# Patient Record
Sex: FEMALE | Race: WHITE | NOT HISPANIC OR LATINO | Employment: OTHER | URBAN - METROPOLITAN AREA
[De-identification: names, ages, dates, MRNs, and addresses within clinical notes are randomized per-mention and may not be internally consistent; named-entity substitution may affect disease eponyms.]

---

## 2017-07-18 ENCOUNTER — PREP FOR SURGERY (OUTPATIENT)
Dept: OTHER | Facility: HOSPITAL | Age: 67
End: 2017-07-18

## 2017-07-18 DIAGNOSIS — R19.5 POSITIVE COLORECTAL CANCER SCREENING USING DNA-BASED STOOL TEST: Primary | ICD-10-CM

## 2017-08-10 PROBLEM — R19.5 POSITIVE COLORECTAL CANCER SCREENING USING DNA-BASED STOOL TEST: Status: ACTIVE | Noted: 2017-08-10

## 2017-08-30 RX ORDER — ATORVASTATIN CALCIUM 20 MG/1
20 TABLET, FILM COATED ORAL DAILY
COMMUNITY
End: 2020-10-05

## 2017-08-30 RX ORDER — CETIRIZINE HYDROCHLORIDE 10 MG/1
10 TABLET ORAL DAILY
COMMUNITY

## 2017-08-30 RX ORDER — LANOLIN ALCOHOL/MO/W.PET/CERES
1000 CREAM (GRAM) TOPICAL EVERY OTHER DAY
COMMUNITY

## 2017-08-30 RX ORDER — IRBESARTAN 150 MG/1
150 TABLET ORAL NIGHTLY
COMMUNITY
End: 2018-09-27

## 2017-08-31 ENCOUNTER — ANESTHESIA (OUTPATIENT)
Dept: GASTROENTEROLOGY | Facility: HOSPITAL | Age: 67
End: 2017-08-31

## 2017-08-31 ENCOUNTER — ANESTHESIA EVENT (OUTPATIENT)
Dept: GASTROENTEROLOGY | Facility: HOSPITAL | Age: 67
End: 2017-08-31

## 2017-08-31 ENCOUNTER — HOSPITAL ENCOUNTER (OUTPATIENT)
Facility: HOSPITAL | Age: 67
Setting detail: HOSPITAL OUTPATIENT SURGERY
Discharge: HOME | End: 2017-08-31
Attending: SURGERY | Admitting: SURGERY

## 2017-08-31 VITALS
SYSTOLIC BLOOD PRESSURE: 126 MMHG | OXYGEN SATURATION: 94 % | HEART RATE: 66 BPM | BODY MASS INDEX: 26.68 KG/M2 | TEMPERATURE: 97.7 F | RESPIRATION RATE: 16 BRPM | WEIGHT: 166 LBS | HEIGHT: 66 IN | DIASTOLIC BLOOD PRESSURE: 72 MMHG

## 2017-08-31 DIAGNOSIS — R19.5 POSITIVE COLORECTAL CANCER SCREENING USING DNA-BASED STOOL TEST: ICD-10-CM

## 2017-08-31 LAB — GLUCOSE BLDC GLUCOMTR-MCNC: 149 MG/DL (ref 70–130)

## 2017-08-31 PROCEDURE — 45380 COLONOSCOPY AND BIOPSY: CPT | Performed by: SURGERY

## 2017-08-31 PROCEDURE — 25010000002 PROPOFOL 10 MG/ML EMULSION: Performed by: ANESTHESIOLOGY

## 2017-08-31 PROCEDURE — 82962 GLUCOSE BLOOD TEST: CPT

## 2017-08-31 PROCEDURE — S0260 H&P FOR SURGERY: HCPCS | Performed by: SURGERY

## 2017-08-31 PROCEDURE — 88305 TISSUE EXAM BY PATHOLOGIST: CPT | Performed by: SURGERY

## 2017-08-31 RX ORDER — LIDOCAINE HYDROCHLORIDE 20 MG/ML
INJECTION, SOLUTION INFILTRATION; PERINEURAL AS NEEDED
Status: DISCONTINUED | OUTPATIENT
Start: 2017-08-31 | End: 2017-08-31 | Stop reason: SURG

## 2017-08-31 RX ORDER — SODIUM CHLORIDE, SODIUM LACTATE, POTASSIUM CHLORIDE, CALCIUM CHLORIDE 600; 310; 30; 20 MG/100ML; MG/100ML; MG/100ML; MG/100ML
30 INJECTION, SOLUTION INTRAVENOUS CONTINUOUS PRN
Status: DISCONTINUED | OUTPATIENT
Start: 2017-08-31 | End: 2017-08-31 | Stop reason: HOSPADM

## 2017-08-31 RX ORDER — PROPOFOL 10 MG/ML
VIAL (ML) INTRAVENOUS AS NEEDED
Status: DISCONTINUED | OUTPATIENT
Start: 2017-08-31 | End: 2017-08-31 | Stop reason: SURG

## 2017-08-31 RX ORDER — PROPOFOL 10 MG/ML
VIAL (ML) INTRAVENOUS CONTINUOUS PRN
Status: DISCONTINUED | OUTPATIENT
Start: 2017-08-31 | End: 2017-08-31 | Stop reason: SURG

## 2017-08-31 RX ADMIN — PROPOFOL 140 MCG/KG/MIN: 10 INJECTION, EMULSION INTRAVENOUS at 08:05

## 2017-08-31 RX ADMIN — SODIUM CHLORIDE, POTASSIUM CHLORIDE, SODIUM LACTATE AND CALCIUM CHLORIDE 30 ML/HR: 600; 310; 30; 20 INJECTION, SOLUTION INTRAVENOUS at 07:23

## 2017-08-31 RX ADMIN — LIDOCAINE HYDROCHLORIDE 50 MG: 20 INJECTION, SOLUTION INFILTRATION; PERINEURAL at 08:05

## 2017-08-31 RX ADMIN — PROPOFOL 150 MG: 10 INJECTION, EMULSION INTRAVENOUS at 08:05

## 2017-09-01 LAB
CYTO UR: NORMAL
LAB AP CASE REPORT: NORMAL
Lab: NORMAL
PATH REPORT.FINAL DX SPEC: NORMAL
PATH REPORT.GROSS SPEC: NORMAL

## 2018-09-27 RX ORDER — VIT C/B6/B5/MAGNESIUM/HERB 173 50-5-6-5MG
1 CAPSULE ORAL DAILY
COMMUNITY
End: 2019-10-31

## 2018-09-27 RX ORDER — IRBESARTAN 300 MG/1
300 TABLET ORAL NIGHTLY
COMMUNITY
End: 2019-10-31

## 2018-09-27 RX ORDER — KETOCONAZOLE 20 MG/ML
1 SHAMPOO TOPICAL 2 TIMES WEEKLY
COMMUNITY

## 2018-09-27 RX ORDER — KETOCONAZOLE 20 MG/G
1 CREAM TOPICAL AS NEEDED
COMMUNITY
End: 2020-06-15 | Stop reason: SDUPTHER

## 2018-09-27 RX ORDER — FLUOCINONIDE TOPICAL SOLUTION USP, 0.05% 0.5 MG/ML
1 SOLUTION TOPICAL 2 TIMES DAILY
COMMUNITY
End: 2019-10-31

## 2018-09-28 ENCOUNTER — HOSPITAL ENCOUNTER (OUTPATIENT)
Facility: HOSPITAL | Age: 68
Setting detail: HOSPITAL OUTPATIENT SURGERY
Discharge: HOME | End: 2018-09-28
Attending: UROLOGY | Admitting: UROLOGY

## 2018-09-28 ENCOUNTER — ANESTHESIA EVENT (OUTPATIENT)
Dept: PERIOP | Facility: HOSPITAL | Age: 68
End: 2018-09-28

## 2018-09-28 ENCOUNTER — ANESTHESIA (OUTPATIENT)
Dept: PERIOP | Facility: HOSPITAL | Age: 68
End: 2018-09-28

## 2018-09-28 VITALS
TEMPERATURE: 98.6 F | HEIGHT: 66 IN | DIASTOLIC BLOOD PRESSURE: 60 MMHG | OXYGEN SATURATION: 93 % | SYSTOLIC BLOOD PRESSURE: 114 MMHG | HEART RATE: 78 BPM | RESPIRATION RATE: 16 BRPM

## 2018-09-28 DIAGNOSIS — N20.0 RENAL CALCULUS, LEFT: Primary | ICD-10-CM

## 2018-09-28 LAB — GLUCOSE BLDC GLUCOMTR-MCNC: 143 MG/DL (ref 70–130)

## 2018-09-28 PROCEDURE — 25010000002 KETOROLAC TROMETHAMINE PER 15 MG: Performed by: NURSE ANESTHETIST, CERTIFIED REGISTERED

## 2018-09-28 PROCEDURE — C1758 CATHETER, URETERAL: HCPCS | Performed by: UROLOGY

## 2018-09-28 PROCEDURE — 25010000003 CEFAZOLIN 1-4 GM/50ML-% SOLUTION: Performed by: UROLOGY

## 2018-09-28 PROCEDURE — 25010000002 ONDANSETRON PER 1 MG: Performed by: NURSE ANESTHETIST, CERTIFIED REGISTERED

## 2018-09-28 PROCEDURE — 25010000002 FENTANYL CITRATE (PF) 100 MCG/2ML SOLUTION: Performed by: ANESTHESIOLOGY

## 2018-09-28 PROCEDURE — 25010000002 MIDAZOLAM PER 1 MG: Performed by: ANESTHESIOLOGY

## 2018-09-28 PROCEDURE — 25010000002 PROPOFOL 10 MG/ML EMULSION: Performed by: NURSE ANESTHETIST, CERTIFIED REGISTERED

## 2018-09-28 PROCEDURE — 82962 GLUCOSE BLOOD TEST: CPT

## 2018-09-28 PROCEDURE — 93005 ELECTROCARDIOGRAM TRACING: CPT | Performed by: ANESTHESIOLOGY

## 2018-09-28 PROCEDURE — C1769 GUIDE WIRE: HCPCS | Performed by: UROLOGY

## 2018-09-28 RX ORDER — HYDROCODONE BITARTRATE AND ACETAMINOPHEN 5; 325 MG/1; MG/1
1 TABLET ORAL EVERY 6 HOURS PRN
Status: DISCONTINUED | OUTPATIENT
Start: 2018-09-28 | End: 2018-09-28 | Stop reason: HOSPADM

## 2018-09-28 RX ORDER — SULFAMETHOXAZOLE AND TRIMETHOPRIM 800; 160 MG/1; MG/1
1 TABLET ORAL 2 TIMES DAILY
Qty: 6 TABLET | Refills: 0 | Status: SHIPPED | OUTPATIENT
Start: 2018-09-28 | End: 2019-10-31

## 2018-09-28 RX ORDER — TAMSULOSIN HYDROCHLORIDE 0.4 MG/1
1 CAPSULE ORAL DAILY
Qty: 10 CAPSULE | Refills: 0 | Status: SHIPPED | OUTPATIENT
Start: 2018-09-28 | End: 2018-09-28

## 2018-09-28 RX ORDER — FENTANYL CITRATE 50 UG/ML
50 INJECTION, SOLUTION INTRAMUSCULAR; INTRAVENOUS
Status: DISCONTINUED | OUTPATIENT
Start: 2018-09-28 | End: 2018-09-28 | Stop reason: HOSPADM

## 2018-09-28 RX ORDER — PROPOFOL 10 MG/ML
VIAL (ML) INTRAVENOUS AS NEEDED
Status: DISCONTINUED | OUTPATIENT
Start: 2018-09-28 | End: 2018-09-28 | Stop reason: SURG

## 2018-09-28 RX ORDER — MIDAZOLAM HYDROCHLORIDE 1 MG/ML
2 INJECTION INTRAMUSCULAR; INTRAVENOUS
Status: DISCONTINUED | OUTPATIENT
Start: 2018-09-28 | End: 2018-09-28 | Stop reason: HOSPADM

## 2018-09-28 RX ORDER — MIDAZOLAM HYDROCHLORIDE 1 MG/ML
1 INJECTION INTRAMUSCULAR; INTRAVENOUS
Status: DISCONTINUED | OUTPATIENT
Start: 2018-09-28 | End: 2018-09-28 | Stop reason: HOSPADM

## 2018-09-28 RX ORDER — FAMOTIDINE 10 MG/ML
20 INJECTION, SOLUTION INTRAVENOUS ONCE
Status: COMPLETED | OUTPATIENT
Start: 2018-09-28 | End: 2018-09-28

## 2018-09-28 RX ORDER — LIDOCAINE HYDROCHLORIDE 10 MG/ML
0.5 INJECTION, SOLUTION EPIDURAL; INFILTRATION; INTRACAUDAL; PERINEURAL ONCE AS NEEDED
Status: DISCONTINUED | OUTPATIENT
Start: 2018-09-28 | End: 2018-09-28 | Stop reason: HOSPADM

## 2018-09-28 RX ORDER — SODIUM CHLORIDE, SODIUM LACTATE, POTASSIUM CHLORIDE, CALCIUM CHLORIDE 600; 310; 30; 20 MG/100ML; MG/100ML; MG/100ML; MG/100ML
9 INJECTION, SOLUTION INTRAVENOUS CONTINUOUS
Status: DISCONTINUED | OUTPATIENT
Start: 2018-09-28 | End: 2018-09-28 | Stop reason: HOSPADM

## 2018-09-28 RX ORDER — SULFAMETHOXAZOLE AND TRIMETHOPRIM 800; 160 MG/1; MG/1
1 TABLET ORAL 2 TIMES DAILY
Qty: 10 TABLET | Refills: 0 | Status: SHIPPED | OUTPATIENT
Start: 2018-09-28 | End: 2019-10-31

## 2018-09-28 RX ORDER — ONDANSETRON 4 MG/1
4 TABLET, FILM COATED ORAL DAILY PRN
Qty: 12 TABLET | Refills: 0 | Status: SHIPPED | OUTPATIENT
Start: 2018-09-28 | End: 2019-10-31

## 2018-09-28 RX ORDER — HYDROCODONE BITARTRATE AND ACETAMINOPHEN 7.5; 325 MG/1; MG/1
1-2 TABLET ORAL EVERY 4 HOURS PRN
Qty: 20 TABLET | Refills: 0 | Status: SHIPPED | OUTPATIENT
Start: 2018-09-28 | End: 2019-10-31

## 2018-09-28 RX ORDER — CEFAZOLIN SODIUM 1 G/50ML
1 INJECTION, SOLUTION INTRAVENOUS ONCE
Status: COMPLETED | OUTPATIENT
Start: 2018-09-28 | End: 2018-09-28

## 2018-09-28 RX ORDER — EPHEDRINE SULFATE 50 MG/ML
INJECTION, SOLUTION INTRAVENOUS AS NEEDED
Status: DISCONTINUED | OUTPATIENT
Start: 2018-09-28 | End: 2018-09-28 | Stop reason: SURG

## 2018-09-28 RX ORDER — CIPROFLOXACIN 500 MG/1
500 TABLET, FILM COATED ORAL EVERY 12 HOURS SCHEDULED
COMMUNITY
End: 2019-10-31

## 2018-09-28 RX ORDER — KETOROLAC TROMETHAMINE 30 MG/ML
INJECTION, SOLUTION INTRAMUSCULAR; INTRAVENOUS AS NEEDED
Status: DISCONTINUED | OUTPATIENT
Start: 2018-09-28 | End: 2018-09-28 | Stop reason: SURG

## 2018-09-28 RX ORDER — ONDANSETRON 2 MG/ML
INJECTION INTRAMUSCULAR; INTRAVENOUS AS NEEDED
Status: DISCONTINUED | OUTPATIENT
Start: 2018-09-28 | End: 2018-09-28 | Stop reason: SURG

## 2018-09-28 RX ORDER — LIDOCAINE HYDROCHLORIDE 20 MG/ML
INJECTION, SOLUTION INFILTRATION; PERINEURAL AS NEEDED
Status: DISCONTINUED | OUTPATIENT
Start: 2018-09-28 | End: 2018-09-28 | Stop reason: SURG

## 2018-09-28 RX ORDER — SODIUM CHLORIDE 9 MG/ML
INJECTION, SOLUTION INTRAVENOUS AS NEEDED
Status: DISCONTINUED | OUTPATIENT
Start: 2018-09-28 | End: 2018-09-28 | Stop reason: HOSPADM

## 2018-09-28 RX ORDER — TRAMADOL HYDROCHLORIDE 50 MG/1
50 TABLET ORAL EVERY 6 HOURS PRN
COMMUNITY
End: 2019-10-31

## 2018-09-28 RX ORDER — HYDROCODONE BITARTRATE AND ACETAMINOPHEN 7.5; 325 MG/1; MG/1
1-2 TABLET ORAL EVERY 4 HOURS PRN
Qty: 20 TABLET | Refills: 0 | Status: SHIPPED | OUTPATIENT
Start: 2018-09-28 | End: 2018-09-28

## 2018-09-28 RX ORDER — SODIUM CHLORIDE 0.9 % (FLUSH) 0.9 %
1-10 SYRINGE (ML) INJECTION AS NEEDED
Status: DISCONTINUED | OUTPATIENT
Start: 2018-09-28 | End: 2018-09-28 | Stop reason: HOSPADM

## 2018-09-28 RX ADMIN — FENTANYL CITRATE 50 MCG: 50 INJECTION INTRAMUSCULAR; INTRAVENOUS at 09:22

## 2018-09-28 RX ADMIN — MIDAZOLAM HYDROCHLORIDE 1 MG: 2 INJECTION, SOLUTION INTRAMUSCULAR; INTRAVENOUS at 08:55

## 2018-09-28 RX ADMIN — CEFAZOLIN SODIUM 1 G: 1 INJECTION, SOLUTION INTRAVENOUS at 09:26

## 2018-09-28 RX ADMIN — ONDANSETRON 4 MG: 2 INJECTION INTRAMUSCULAR; INTRAVENOUS at 09:28

## 2018-09-28 RX ADMIN — FAMOTIDINE 20 MG: 10 INJECTION, SOLUTION INTRAVENOUS at 08:52

## 2018-09-28 RX ADMIN — EPHEDRINE SULFATE 10 MG: 50 INJECTION INTRAMUSCULAR; INTRAVENOUS; SUBCUTANEOUS at 09:32

## 2018-09-28 RX ADMIN — SODIUM CHLORIDE, POTASSIUM CHLORIDE, SODIUM LACTATE AND CALCIUM CHLORIDE 9 ML/HR: 600; 310; 30; 20 INJECTION, SOLUTION INTRAVENOUS at 08:52

## 2018-09-28 RX ADMIN — KETOROLAC TROMETHAMINE 30 MG: 30 INJECTION, SOLUTION INTRAMUSCULAR; INTRAVENOUS at 09:28

## 2018-09-28 RX ADMIN — LIDOCAINE HYDROCHLORIDE 40 MG: 20 INJECTION, SOLUTION INFILTRATION; PERINEURAL at 09:25

## 2018-09-28 RX ADMIN — PROPOFOL 200 MG: 10 INJECTION, EMULSION INTRAVENOUS at 09:25

## 2018-09-28 RX ADMIN — EPHEDRINE SULFATE 10 MG: 50 INJECTION INTRAMUSCULAR; INTRAVENOUS; SUBCUTANEOUS at 09:52

## 2018-09-28 RX ADMIN — MIDAZOLAM HYDROCHLORIDE 1 MG: 2 INJECTION, SOLUTION INTRAMUSCULAR; INTRAVENOUS at 08:53

## 2018-09-28 RX ADMIN — EPHEDRINE SULFATE 10 MG: 50 INJECTION INTRAMUSCULAR; INTRAVENOUS; SUBCUTANEOUS at 09:42

## 2018-09-28 RX ADMIN — HYDROCODONE BITARTRATE AND ACETAMINOPHEN 1 TABLET: 5; 325 TABLET ORAL at 10:40

## 2018-09-28 NOTE — ANESTHESIA PREPROCEDURE EVALUATION
Anesthesia Evaluation     Patient summary reviewed and Nursing notes reviewed   NPO Solid Status: > 8 hours  NPO Liquid Status: > 2 hours           Airway   Mallampati: II  no difficulty expected  Comment: Small mouth  Dental - normal exam     Pulmonary     breath sounds clear to auscultation  Cardiovascular     ECG reviewed  Rhythm: regular  Rate: normal    (+) hypertension,       Neuro/Psych  GI/Hepatic/Renal/Endo    (+)   diabetes mellitus type 2,     Musculoskeletal     Abdominal    Substance History      OB/GYN          Other                        Anesthesia Plan    ASA 3     general     intravenous induction   Anesthetic plan, all risks, benefits, and alternatives have been provided, discussed and informed consent has been obtained with: patient.

## 2018-09-28 NOTE — ANESTHESIA PROCEDURE NOTES
Airway  Urgency: elective    Airway not difficult    General Information and Staff    Patient location during procedure: OR  Anesthesiologist: JENNIFER GORDON    Indications and Patient Condition  Indications for airway management: airway protection    Preoxygenated: yes  Mask difficulty assessment: 1 - vent by mask    Final Airway Details  Final airway type: supraglottic airway      Successful airway: unique  Size 4    Number of attempts at approach: 1    Additional Comments  Smooth IV/mask induction and placement of LMA. Atraumatic, lips/teeth/mouth intact, as preop. +ETCO2, bilateral breath sounds and equal. By redd

## 2018-09-28 NOTE — ANESTHESIA POSTPROCEDURE EVALUATION
Patient: Valery Maynard    Procedure Summary     Date:  09/28/18 Room / Location:   [facility]  [facility]  [facility]    Anesthesia Start:  0921 Anesthesia Stop:  1004    Procedure:  LT EXTRACORPOREAL SHOCKWAVE LITHOTRIPSY AND CYSTOSCOPY (Left ) Diagnosis:      Surgeon:  Ye Moreau MD Provider:  Los De La Garza MD    Anesthesia Type:  general ASA Status:  3          Anesthesia Type: general  Last vitals  BP   114/60 (09/28/18 1105)   Temp   37 °C (98.6 °F) (09/28/18 1045)   Pulse   78 (09/28/18 1105)   Resp   16 (09/28/18 1105)     SpO2   93 % (09/28/18 1105)     Post Anesthesia Care and Evaluation    Patient location during evaluation: bedside  Patient participation: complete - patient participated  Level of consciousness: awake  Pain score: 2  Pain management: adequate  Airway patency: patent  Anesthetic complications: No anesthetic complications    Cardiovascular status: acceptable  Respiratory status: acceptable  Hydration status: acceptable    Comments: --------------------            09/28/18               1105     --------------------   BP:       114/60     Pulse:      78       Resp:       16       Temp:                SpO2:      93%      --------------------

## 2019-10-31 ENCOUNTER — OFFICE VISIT (OUTPATIENT)
Dept: FAMILY MEDICINE CLINIC | Facility: CLINIC | Age: 69
End: 2019-10-31

## 2019-10-31 VITALS
SYSTOLIC BLOOD PRESSURE: 112 MMHG | DIASTOLIC BLOOD PRESSURE: 84 MMHG | WEIGHT: 163.2 LBS | HEIGHT: 66 IN | BODY MASS INDEX: 26.23 KG/M2 | TEMPERATURE: 98.3 F | HEART RATE: 65 BPM | OXYGEN SATURATION: 97 %

## 2019-10-31 DIAGNOSIS — N30.00 ACUTE CYSTITIS WITHOUT HEMATURIA: ICD-10-CM

## 2019-10-31 DIAGNOSIS — I10 ESSENTIAL HYPERTENSION: ICD-10-CM

## 2019-10-31 DIAGNOSIS — R35.0 URINARY FREQUENCY: ICD-10-CM

## 2019-10-31 DIAGNOSIS — E11.9 TYPE 2 DIABETES MELLITUS WITHOUT COMPLICATION, WITHOUT LONG-TERM CURRENT USE OF INSULIN (HCC): Primary | ICD-10-CM

## 2019-10-31 DIAGNOSIS — E78.5 HYPERLIPIDEMIA, UNSPECIFIED HYPERLIPIDEMIA TYPE: ICD-10-CM

## 2019-10-31 LAB
BILIRUB BLD-MCNC: NEGATIVE MG/DL
GLUCOSE UR STRIP-MCNC: NEGATIVE MG/DL
HBA1C MFR BLD: 6.8 %
KETONES UR QL: NEGATIVE
LEUKOCYTE EST, POC: NEGATIVE
NITRITE UR-MCNC: POSITIVE MG/ML
PH UR: 5.5 [PH] (ref 5–8)
POC MICROALBUMIN URINE: 30
PROT UR STRIP-MCNC: NEGATIVE MG/DL
RBC # UR STRIP: NEGATIVE /UL
SP GR UR: 1.02 (ref 1–1.03)
UROBILINOGEN UR QL: NORMAL

## 2019-10-31 PROCEDURE — 99214 OFFICE O/P EST MOD 30 MIN: CPT | Performed by: FAMILY MEDICINE

## 2019-10-31 PROCEDURE — 83036 HEMOGLOBIN GLYCOSYLATED A1C: CPT | Performed by: FAMILY MEDICINE

## 2019-10-31 PROCEDURE — 81003 URINALYSIS AUTO W/O SCOPE: CPT | Performed by: FAMILY MEDICINE

## 2019-10-31 PROCEDURE — 82044 UR ALBUMIN SEMIQUANTITATIVE: CPT | Performed by: FAMILY MEDICINE

## 2019-10-31 RX ORDER — NITROFURANTOIN MACROCRYSTALS 100 MG/1
100 CAPSULE ORAL 2 TIMES DAILY
Qty: 10 CAPSULE | Refills: 0 | Status: SHIPPED | OUTPATIENT
Start: 2019-10-31 | End: 2020-02-03

## 2019-10-31 RX ORDER — IRBESARTAN 300 MG/1
300 TABLET ORAL NIGHTLY
COMMUNITY
End: 2020-10-05

## 2019-10-31 RX ORDER — METFORMIN HYDROCHLORIDE 500 MG/1
TABLET, EXTENDED RELEASE ORAL
Refills: 3 | COMMUNITY
Start: 2019-09-06 | End: 2020-06-01

## 2019-10-31 NOTE — PROGRESS NOTES
Subjective   Valery Maynard is a 69 y.o. female.     Chief Complaint   Patient presents with   • Diabetes        Patient doing well and is here to establish my new office.  She is compliant with her medication and watching her diet for diabetes.  The only concern she has is that she feels she has been drinking too much water she is she is getting up 2-3 times at night to empty her bladder over the past few weeks when she used to only get up once.  She denies any pain with urination.  She denies any vaginal discharge.  She denies any urinary odor.         Home Blood Glucose Monitoring: doing well  Last Eye Exam: 10/30/19  Last microalbumin: now  Last Foot Exam: now    The following portions of the patient's history were reviewed and updated as appropriate: allergies, current medications, past family history, past medical history, past social history, past surgical history and problem list.    Past Medical History:   Diagnosis Date   • Allergic conjunctivitis    • Arthritis     R FOOT   • Back pain, acute    • Contusion of right knee    • Cough    • Diabetes mellitus (CMS/[facility])    • Diabetes mellitus type 2, controlled (CMS/[facility])    • Encounter for preventive health examination    • Fatigue    • Former smoker     quit smoking 30 years ago, was 1 ppd for 10-15 years   • Hematuria    • History of kidney stones    • History of nephrolithiasis    • History of urinary tract infection     History of Acute UTI   • Hyperlipidemia    • Hypertension    • Kidney stone    • Menopause    • Microalbuminuria    • Murmur    • Muscle cramp    • Need for immunization against influenza    • Need for influenza vaccination    • Need for pneumococcal vaccine    • Peripheral neuropathy    • Psoriasis    • Rash     [facility] WENT TO DERMATOLOGIST, GAVE PATIENT PRESCRIPTION CREAM   • Screening for malignant neoplasm of colon    • Visit for screening mammogram    • Vitamin B12 deficiency    • Vitamin D deficiency        Past Surgical History:    Procedure Laterality Date   • COLONOSCOPY N/A 8/31/2017    Procedure: COLONOSCOPY TO CECUM WITH COLD BIOPSY POLYPECTOMIES;  Surgeon: Dimitri Olivera MD;  Location: [facility] ENDOSCOPY;  Service:    • EXTRACORPOREAL SHOCK WAVE LITHOTRIPSY (ESWL) Left 9/28/2018    Procedure: LT EXTRACORPOREAL SHOCKWAVE LITHOTRIPSY AND CYSTOSCOPY;  Surgeon: Ye Moreau MD;  Location[place] [facility] [place] [facility] [place];  Service: Urology   • KIDNEY STONE SURGERY         Family History   Problem Relation Age of Onset   • Coronary artery disease Mother    • Arthritis Father    • Diabetes Father    • No Known Problems Other    • Malig Hyperthermia Neg Hx        Social History     Socioeconomic History   • Marital status:      Spouse name: Not on file   • Number of children: Not on file   • Years of education: Not on file   • Highest education level: Not on file   Tobacco Use   • Smoking status: Former Smoker   • Smokeless tobacco: Never Used   • Tobacco comment: QUIT 25 YRS AGO-was 1 ppd for 10-15 years   Substance and Sexual Activity   • Alcohol use: Yes     Comment: RARE   • Drug use: No   • Sexual activity: Defer         Current Outpatient Medications:   •  atorvastatin (LIPITOR) 20 MG tablet, Take 20 mg by mouth Daily., Disp: , Rfl:   •  cetirizine (zyrTEC) 10 MG tablet, Take 10 mg by mouth Daily., Disp: , Rfl:   •  irbesartan (AVAPRO) 300 MG tablet, Take 300 mg by mouth Every Night., Disp: , Rfl:   •  ketoconazole (NIZORAL) 2 % cream, Apply 1 application topically to the appropriate area as directed As Needed for Itching., Disp: , Rfl:   •  ketoconazole (NIZORAL) 2 % shampoo, Apply 1 application topically to the appropriate area as directed 2 (Two) Times a Week., Disp: , Rfl:   •  metFORMIN ER (GLUCOPHAGE-XR) 500 MG 24 hr tablet, TAKE THREE TABLETS BY MOUTH EVERY EVENING FOR BLOOD SUGAR, Disp: , Rfl: 3  •  SITagliptin (JANUVIA) 100 MG tablet, Take 100 mg by mouth Every Morning., Disp: , Rfl:   •  vitamin B-12 (CYANOCOBALAMIN) 1000 MCG  "tablet, Take 1,000 mcg by mouth Every Other Day., Disp: , Rfl:   •  nitrofurantoin (MACRODANTIN) 100 MG capsule, Take 1 capsule by mouth 2 (Two) Times a Day., Disp: 10 capsule, Rfl: 0    Review of Systems   Constitutional: Negative for chills, fatigue and fever.   HENT: Negative for congestion, rhinorrhea and sore throat.    Respiratory: Negative for cough and shortness of breath.    Cardiovascular: Negative for chest pain and leg swelling.   Gastrointestinal: Negative for abdominal pain.   Endocrine: Negative for polydipsia and polyuria.   Genitourinary: Positive for frequency. Negative for dysuria.   Musculoskeletal: Negative for arthralgias and myalgias.   Skin: Negative for rash.   Neurological: Negative for dizziness.   Hematological: Does not bruise/bleed easily.   Psychiatric/Behavioral: Negative for sleep disturbance.       Objective   Vitals:    10/31/19 0908   BP: 112/84   Pulse: 65   Temp: 98.3 °F (36.8 °C)   SpO2: 97%   Weight: 74 kg (163 lb 3.2 oz)   Height: 167.6 cm (66\")     Body mass index is 26.34 kg/m².  Physical Exam   Constitutional: She is oriented to person, place, and time. She appears well-developed and well-nourished.   HENT:   Head: Normocephalic and atraumatic.   Eyes: Conjunctivae and EOM are normal. Pupils are equal, round, and reactive to light.   Neck: Neck supple. No thyromegaly present.   Cardiovascular: Normal rate and regular rhythm.   No murmur heard.  Pulmonary/Chest: Effort normal and breath sounds normal. She has no wheezes.   Abdominal: Soft.   Musculoskeletal: Normal range of motion.   Neurological: She is alert and oriented to person, place, and time. No cranial nerve deficit.   Skin: Skin is warm and dry.   Psychiatric: She has a normal mood and affect.       Office Visit on 10/31/2019   Component Date Value Ref Range Status   • Hemoglobin A1C 10/31/2019 6.8  % Final   • Specific Gravity  10/31/2019 1.025  1.005 - 1.030 Final   • pH, Urine 10/31/2019 5.5  5.0 - 8.0 Final "   • Leukocytes 10/31/2019 Negative  Negative Final   • Nitrite, UA 10/31/2019 Positive* Negative Final   • Protein, POC 10/31/2019 Negative  Negative mg/dL Final   • Glucose, UA 10/31/2019 Negative  Negative, 1000 mg/dL (3+) mg/dL Final   • Ketones, UA 10/31/2019 Negative  Negative Final   • Urobilinogen, UA 10/31/2019 Normal  Normal Final   • Bilirubin 10/31/2019 Negative  Negative Final   • Blood, UA 10/31/2019 Negative  Negative Final   • Microalbumin, Urine 10/31/2019 30   Final          Assessment/Plan   Valery was seen today for diabetes.    Diagnoses and all orders for this visit:    Type 2 diabetes mellitus without complication, without long-term current use of insulin (CMS/[facility])  -     POC Glycosylated Hemoglobin (Hb A1C)  -     CBC & Differential  -     Comprehensive Metabolic Panel  -     Lipid Panel  -     TSH  -     POCT microalbumin    Essential hypertension    Hyperlipidemia, unspecified hyperlipidemia type  -     Lipid Panel    Urinary frequency  -     POC Urinalysis Dipstick, Automated  -     Urine Culture - Urine, Urine, Clean Catch  -     Urine Culture - Urine, Urine, Clean Catch    Acute cystitis without hematuria    Other orders  -     nitrofurantoin (MACRODANTIN) 100 MG capsule; Take 1 capsule by mouth 2 (Two) Times a Day.               Patient Instructions   Get high dose flu vaccine at pharmacy.

## 2019-11-01 LAB
ALBUMIN SERPL-MCNC: 4.4 G/DL (ref 3.5–5.2)
ALBUMIN/GLOB SERPL: 1.9 G/DL
ALP SERPL-CCNC: 117 U/L (ref 39–117)
ALT SERPL-CCNC: 13 U/L (ref 1–33)
AST SERPL-CCNC: 13 U/L (ref 1–32)
BASOPHILS # BLD AUTO: 0.03 10*3/MM3 (ref 0–0.2)
BASOPHILS NFR BLD AUTO: 0.6 % (ref 0–1.5)
BILIRUB SERPL-MCNC: 0.5 MG/DL (ref 0.2–1.2)
BUN SERPL-MCNC: 15 MG/DL (ref 8–23)
BUN/CREAT SERPL: 22.4 (ref 7–25)
CALCIUM SERPL-MCNC: 9.2 MG/DL (ref 8.6–10.5)
CHLORIDE SERPL-SCNC: 105 MMOL/L (ref 98–107)
CHOLEST SERPL-MCNC: 165 MG/DL (ref 0–200)
CO2 SERPL-SCNC: 27.1 MMOL/L (ref 22–29)
CREAT SERPL-MCNC: 0.67 MG/DL (ref 0.57–1)
EOSINOPHIL # BLD AUTO: 0.25 10*3/MM3 (ref 0–0.4)
EOSINOPHIL NFR BLD AUTO: 5.3 % (ref 0.3–6.2)
ERYTHROCYTE [DISTWIDTH] IN BLOOD BY AUTOMATED COUNT: 11.9 % (ref 12.3–15.4)
GLOBULIN SER CALC-MCNC: 2.3 GM/DL
GLUCOSE SERPL-MCNC: 135 MG/DL (ref 65–99)
HCT VFR BLD AUTO: 39.7 % (ref 34–46.6)
HDLC SERPL-MCNC: 54 MG/DL (ref 40–60)
HGB BLD-MCNC: 13.7 G/DL (ref 12–15.9)
IMM GRANULOCYTES # BLD AUTO: 0.01 10*3/MM3 (ref 0–0.05)
IMM GRANULOCYTES NFR BLD AUTO: 0.2 % (ref 0–0.5)
LDLC SERPL CALC-MCNC: 87 MG/DL (ref 0–100)
LYMPHOCYTES # BLD AUTO: 1.24 10*3/MM3 (ref 0.7–3.1)
LYMPHOCYTES NFR BLD AUTO: 26.3 % (ref 19.6–45.3)
MCH RBC QN AUTO: 31.9 PG (ref 26.6–33)
MCHC RBC AUTO-ENTMCNC: 34.5 G/DL (ref 31.5–35.7)
MCV RBC AUTO: 92.3 FL (ref 79–97)
MONOCYTES # BLD AUTO: 0.43 10*3/MM3 (ref 0.1–0.9)
MONOCYTES NFR BLD AUTO: 9.1 % (ref 5–12)
NEUTROPHILS # BLD AUTO: 2.75 10*3/MM3 (ref 1.7–7)
NEUTROPHILS NFR BLD AUTO: 58.5 % (ref 42.7–76)
NRBC BLD AUTO-RTO: 0 /100 WBC (ref 0–0.2)
PLATELET # BLD AUTO: 156 10*3/MM3 (ref 140–450)
POTASSIUM SERPL-SCNC: 4.1 MMOL/L (ref 3.5–5.2)
PROT SERPL-MCNC: 6.7 G/DL (ref 6–8.5)
RBC # BLD AUTO: 4.3 10*6/MM3 (ref 3.77–5.28)
SODIUM SERPL-SCNC: 143 MMOL/L (ref 136–145)
TRIGL SERPL-MCNC: 120 MG/DL (ref 0–150)
TSH SERPL DL<=0.005 MIU/L-ACNC: 1.29 UIU/ML (ref 0.27–4.2)
VLDLC SERPL CALC-MCNC: 24 MG/DL
WBC # BLD AUTO: 4.71 10*3/MM3 (ref 3.4–10.8)

## 2019-11-03 LAB
BACTERIA UR CULT: ABNORMAL
BACTERIA UR CULT: ABNORMAL
OTHER ANTIBIOTIC SUSC ISLT: ABNORMAL

## 2020-02-03 ENCOUNTER — OFFICE VISIT (OUTPATIENT)
Dept: FAMILY MEDICINE CLINIC | Facility: CLINIC | Age: 70
End: 2020-02-03

## 2020-02-03 VITALS
OXYGEN SATURATION: 95 % | WEIGHT: 163.2 LBS | HEART RATE: 60 BPM | BODY MASS INDEX: 26.23 KG/M2 | SYSTOLIC BLOOD PRESSURE: 130 MMHG | HEIGHT: 66 IN | TEMPERATURE: 98.2 F | DIASTOLIC BLOOD PRESSURE: 72 MMHG

## 2020-02-03 DIAGNOSIS — E78.5 HYPERLIPIDEMIA, UNSPECIFIED HYPERLIPIDEMIA TYPE: ICD-10-CM

## 2020-02-03 DIAGNOSIS — L21.9 SEBORRHEIC DERMATITIS OF SCALP: ICD-10-CM

## 2020-02-03 DIAGNOSIS — E11.9 TYPE 2 DIABETES MELLITUS WITHOUT COMPLICATION, WITHOUT LONG-TERM CURRENT USE OF INSULIN (HCC): Primary | ICD-10-CM

## 2020-02-03 DIAGNOSIS — I10 ESSENTIAL HYPERTENSION: ICD-10-CM

## 2020-02-03 LAB
GLUCOSE BLDC GLUCOMTR-MCNC: 132 MG/DL (ref 70–130)
HBA1C MFR BLD: 7 %

## 2020-02-03 PROCEDURE — 99213 OFFICE O/P EST LOW 20 MIN: CPT | Performed by: FAMILY MEDICINE

## 2020-02-03 PROCEDURE — 82962 GLUCOSE BLOOD TEST: CPT | Performed by: FAMILY MEDICINE

## 2020-02-03 PROCEDURE — 83036 HEMOGLOBIN GLYCOSYLATED A1C: CPT | Performed by: FAMILY MEDICINE

## 2020-02-03 RX ORDER — CLOBETASOL PROPIONATE 0.46 MG/ML
SOLUTION TOPICAL 2 TIMES DAILY PRN
Qty: 50 ML | Refills: 0 | Status: SHIPPED | OUTPATIENT
Start: 2020-02-03

## 2020-02-03 NOTE — PROGRESS NOTES
Subjective   Valery Maynard is a 69 y.o. female.     Chief Complaint   Patient presents with   • Diabetes   • Hyperlipidemia   • Hypertension        Has been doing well in general.  Working part time.  Compliant with blood pressure and diabetes medications.  Scalp has been more itchy.  Ketoconazole not helping much.            The following portions of the patient's history were reviewed and updated as appropriate: allergies, current medications, past family history, past medical history, past social history, past surgical history and problem list.    Past Medical History:   Diagnosis Date   • Allergic conjunctivitis    • Arthritis     R FOOT   • Back pain, acute    • Contusion of right knee    • Cough    • Diabetes mellitus (CMS/[facility])    • Diabetes mellitus type 2, controlled (CMS/HCC)    • Encounter for preventive health examination    • Fatigue    • Former smoker     quit smoking 30 years ago, was 1 ppd for 10-15 years   • Hematuria    • History of kidney stones    • History of nephrolithiasis    • History of urinary tract infection     History of Acute UTI   • Hyperlipidemia    • Hypertension    • Kidney stone    • Menopause    • Microalbuminuria    • Murmur    • Muscle cramp    • Need for immunization against influenza    • Need for influenza vaccination    • Need for pneumococcal vaccine    • Peripheral neuropathy    • Psoriasis    • Rash     [facility] WENT TO DERMATOLOGIST, GAVE PATIENT PRESCRIPTION CREAM   • Screening for malignant neoplasm of colon    • Visit for screening mammogram    • Vitamin B12 deficiency    • Vitamin D deficiency        Past Surgical History:   Procedure Laterality Date   • COLONOSCOPY N/A 8/31/2017    Procedure: COLONOSCOPY TO CECUM WITH COLD BIOPSY POLYPECTOMIES;  Surgeon: Dimitri Olivera MD;  Location: [facility] ENDOSCOPY;  Service:    • EXTRACORPOREAL SHOCK WAVE LITHOTRIPSY (ESWL) Left 9/28/2018    Procedure: LT EXTRACORPOREAL SHOCKWAVE LITHOTRIPSY AND CYSTOSCOPY;  Surgeon: Prieto  Ye GUSMAN MD;  [place] [facility] [place] [facility] [place]  Service: Urology   • KIDNEY STONE SURGERY         Family History   Problem Relation Age of Onset   • Coronary artery disease Mother    • Arthritis Father    • Diabetes Father    • No Known Problems Other    • Malig Hyperthermia Neg Hx        Social History     Socioeconomic History   • Marital status:      Spouse name: Not on file   • Number of children: Not on file   • Years of education: Not on file   • Highest education level: Not on file   Tobacco Use   • Smoking status: Former Smoker   • Smokeless tobacco: Never Used   • Tobacco comment: QUIT 25 YRS AGO-was 1 ppd for 10-15 years   Substance and Sexual Activity   • Alcohol use: Yes     Comment: RARE   • Drug use: No   • Sexual activity: Defer         Current Outpatient Medications:   •  atorvastatin (LIPITOR) 20 MG tablet, Take 20 mg by mouth Daily., Disp: , Rfl:   •  cetirizine (zyrTEC) 10 MG tablet, Take 10 mg by mouth Daily., Disp: , Rfl:   •  irbesartan (AVAPRO) 300 MG tablet, Take 300 mg by mouth Every Night., Disp: , Rfl:   •  ketoconazole (NIZORAL) 2 % cream, Apply 1 application topically to the appropriate area as directed As Needed for Itching., Disp: , Rfl:   •  ketoconazole (NIZORAL) 2 % shampoo, Apply 1 application topically to the appropriate area as directed 2 (Two) Times a Week., Disp: , Rfl:   •  metFORMIN ER (GLUCOPHAGE-XR) 500 MG 24 hr tablet, TAKE THREE TABLETS BY MOUTH EVERY EVENING FOR BLOOD SUGAR, Disp: , Rfl: 3  •  SITagliptin (JANUVIA) 100 MG tablet, Take 100 mg by mouth Every Morning., Disp: , Rfl:   •  vitamin B-12 (CYANOCOBALAMIN) 1000 MCG tablet, Take 1,000 mcg by mouth Every Other Day., Disp: , Rfl:   •  clobetasol (TEMOVATE) 0.05 % external solution, Apply  topically to the appropriate area as directed 2 (Two) Times a Day As Needed (itch)., Disp: 50 mL, Rfl: 0    Review of Systems   Constitutional: Negative for chills, fatigue and fever.   HENT: Negative for congestion, rhinorrhea and  "sore throat.    Respiratory: Negative for cough and shortness of breath.    Cardiovascular: Negative for chest pain and leg swelling.   Gastrointestinal: Negative for abdominal pain.   Endocrine: Negative for polydipsia and polyuria.   Genitourinary: Negative for dysuria.   Musculoskeletal: Negative for arthralgias and myalgias.   Skin: Positive for rash.   Neurological: Negative for dizziness.   Hematological: Does not bruise/bleed easily.   Psychiatric/Behavioral: Negative for sleep disturbance.       Objective   Vitals:    02/03/20 0901   BP: 130/72   Pulse: 60   Temp: 98.2 °F (36.8 °C)   SpO2: 95%   Weight: 74 kg (163 lb 3.2 oz)   Height: 167.6 cm (66\")     Body mass index is 26.34 kg/m².  Physical Exam   Constitutional: She is oriented to person, place, and time. She appears well-developed and well-nourished.   HENT:   Head: Normocephalic and atraumatic.   Eyes: Pupils are equal, round, and reactive to light. Conjunctivae and EOM are normal.   Neck: Neck supple. No thyromegaly present.   Cardiovascular: Normal rate and regular rhythm.   No murmur heard.  Pulmonary/Chest: Effort normal and breath sounds normal. She has no wheezes.   Abdominal: Soft.   Musculoskeletal: Normal range of motion.   Neurological: She is alert and oriented to person, place, and time. No cranial nerve deficit.   Skin: Skin is warm and dry.   Mild erythema and scale at nape of neck and behind right ear.   Psychiatric: She has a normal mood and affect.       Office Visit on 02/03/2020   Component Date Value Ref Range Status   • Hemoglobin A1C 02/03/2020 7.0  % Final   • Glucose 02/03/2020 132* 70 - 130 mg/dL Final          Assessment/Plan   Valery was seen today for diabetes, hyperlipidemia and hypertension.    Diagnoses and all orders for this visit:    Type 2 diabetes mellitus without complication, without long-term current use of insulin (CMS/[facility])  -     POC Glycosylated Hemoglobin (Hb A1C)  -     POCT Glucose    Essential " hypertension    Hyperlipidemia, unspecified hyperlipidemia type    Seborrheic dermatitis of scalp  -     clobetasol (TEMOVATE) 0.05 % external solution; Apply  topically to the appropriate area as directed 2 (Two) Times a Day As Needed (itch).               Patient Instructions   Let me know if solution to scalp does not help.   Work harder on diet.

## 2020-06-01 RX ORDER — METFORMIN HYDROCHLORIDE 500 MG/1
TABLET, EXTENDED RELEASE ORAL
Qty: 270 TABLET | Refills: 3 | Status: SHIPPED | OUTPATIENT
Start: 2020-06-01 | End: 2020-06-15 | Stop reason: SDUPTHER

## 2020-06-15 ENCOUNTER — OFFICE VISIT (OUTPATIENT)
Dept: FAMILY MEDICINE CLINIC | Facility: CLINIC | Age: 70
End: 2020-06-15

## 2020-06-15 VITALS
HEART RATE: 64 BPM | WEIGHT: 162 LBS | DIASTOLIC BLOOD PRESSURE: 70 MMHG | OXYGEN SATURATION: 94 % | SYSTOLIC BLOOD PRESSURE: 110 MMHG | HEIGHT: 66 IN | BODY MASS INDEX: 26.03 KG/M2 | TEMPERATURE: 96.9 F

## 2020-06-15 DIAGNOSIS — I10 ESSENTIAL HYPERTENSION: ICD-10-CM

## 2020-06-15 DIAGNOSIS — E78.5 HYPERLIPIDEMIA, UNSPECIFIED HYPERLIPIDEMIA TYPE: ICD-10-CM

## 2020-06-15 DIAGNOSIS — E11.9 TYPE 2 DIABETES MELLITUS WITHOUT COMPLICATION, WITHOUT LONG-TERM CURRENT USE OF INSULIN (HCC): Primary | ICD-10-CM

## 2020-06-15 LAB — HBA1C MFR BLD: 6.6 %

## 2020-06-15 PROCEDURE — 83036 HEMOGLOBIN GLYCOSYLATED A1C: CPT | Performed by: FAMILY MEDICINE

## 2020-06-15 PROCEDURE — 99213 OFFICE O/P EST LOW 20 MIN: CPT | Performed by: FAMILY MEDICINE

## 2020-06-15 RX ORDER — METFORMIN HYDROCHLORIDE 500 MG/1
1500 TABLET, EXTENDED RELEASE ORAL EVERY EVENING
Qty: 270 TABLET | Refills: 3 | Status: SHIPPED | OUTPATIENT
Start: 2020-06-15 | End: 2021-06-22 | Stop reason: SDUPTHER

## 2020-06-15 NOTE — PROGRESS NOTES
Subjective   Valery Maynard is a 69 y.o. female.     Chief Complaint   Patient presents with   • Hypertension   • Diabetes        Patient presents for routine checkup on her diabetes and hypertension.  She is doing well in general compliant with her medications without complaint.    Hypertension   Pertinent negatives include no chest pain or shortness of breath.   Diabetes   Pertinent negatives for hypoglycemia include no dizziness. Pertinent negatives for diabetes include no chest pain, no fatigue, no polydipsia and no polyuria.          The following portions of the patient's history were reviewed and updated as appropriate: allergies, current medications, past family history, past medical history, past social history, past surgical history and problem list.    Past Medical History:   Diagnosis Date   • Allergic conjunctivitis    • Arthritis     R FOOT   • Back pain, acute    • Contusion of right knee    • Cough    • Diabetes mellitus (CMS/[facility])    • Diabetes mellitus type 2, controlled (CMS/[facility])    • Encounter for preventive health examination    • Fatigue    • Former smoker     quit smoking 30 years ago, was 1 ppd for 10-15 years   • Hematuria    • History of kidney stones    • History of nephrolithiasis    • History of urinary tract infection     History of Acute UTI   • Hyperlipidemia    • Hypertension    • Kidney stone    • Menopause    • Microalbuminuria    • Murmur    • Muscle cramp    • Need for immunization against influenza    • Need for influenza vaccination    • Need for pneumococcal vaccine    • Peripheral neuropathy    • Psoriasis    • Rash     [facility] WENT TO DERMATOLOGIST, GAVE PATIENT PRESCRIPTION CREAM   • Screening for malignant neoplasm of colon    • Visit for screening mammogram    • Vitamin B12 deficiency    • Vitamin D deficiency        Past Surgical History:   Procedure Laterality Date   • COLONOSCOPY N/A 8/31/2017    Procedure: COLONOSCOPY TO CECUM WITH COLD BIOPSY POLYPECTOMIES;  Surgeon:  Dimitri Olivera MD;  Location: [facility] ENDOSCOPY;  Service:    • EXTRACORPOREAL SHOCK WAVE LITHOTRIPSY (ESWL) Left 9/28/2018    Procedure: LT EXTRACORPOREAL SHOCKWAVE LITHOTRIPSY AND CYSTOSCOPY;  Surgeon: Ye Moreau MD;  Location: [facility] OR [facility] [place];  Service: Urology   • KIDNEY STONE SURGERY         Family History   Problem Relation Age of Onset   • Coronary artery disease Mother    • Arthritis Father    • Diabetes Father    • No Known Problems Other    • Malig Hyperthermia Neg Hx        Social History     Socioeconomic History   • Marital status:      Spouse name: Not on file   • Number of children: Not on file   • Years of education: Not on file   • Highest education level: Not on file   Tobacco Use   • Smoking status: Former Smoker   • Smokeless tobacco: Never Used   • Tobacco comment: QUIT 25 YRS AGO-was 1 ppd for 10-15 years   Substance and Sexual Activity   • Alcohol use: Yes     Comment: RARE   • Drug use: No   • Sexual activity: Defer         Current Outpatient Medications:   •  atorvastatin (LIPITOR) 20 MG tablet, Take 20 mg by mouth Daily., Disp: , Rfl:   •  cetirizine (zyrTEC) 10 MG tablet, Take 10 mg by mouth Daily., Disp: , Rfl:   •  clobetasol (TEMOVATE) 0.05 % external solution, Apply  topically to the appropriate area as directed 2 (Two) Times a Day As Needed (itch)., Disp: 50 mL, Rfl: 0  •  irbesartan (AVAPRO) 300 MG tablet, Take 300 mg by mouth Every Night., Disp: , Rfl:   •  ketoconazole (NIZORAL) 2 % shampoo, Apply 1 application topically to the appropriate area as directed 2 (Two) Times a Week., Disp: , Rfl:   •  metFORMIN ER (GLUCOPHAGE-XR) 500 MG 24 hr tablet, TAKE THREE TABLETS BY MOUTH EVERY EVENING FOR BLOOD SUGAR, Disp: 270 tablet, Rfl: 3  •  SITagliptin (JANUVIA) 100 MG tablet, Take 100 mg by mouth Every Morning., Disp: , Rfl:   •  vitamin B-12 (CYANOCOBALAMIN) 1000 MCG tablet, Take 1,000 mcg by mouth Every Other Day., Disp: , Rfl:     Review of Systems   Constitutional:  "Negative for chills, fatigue and fever.   HENT: Negative for congestion, rhinorrhea and sore throat.    Respiratory: Negative for cough and shortness of breath.    Cardiovascular: Negative for chest pain and leg swelling.   Gastrointestinal: Negative for abdominal pain.   Endocrine: Negative for polydipsia and polyuria.   Genitourinary: Negative for dysuria.   Musculoskeletal: Negative for arthralgias and myalgias.   Skin: Negative for rash.   Neurological: Negative for dizziness.   Hematological: Does not bruise/bleed easily.   Psychiatric/Behavioral: Negative for sleep disturbance.       Objective   Vitals:    06/15/20 0833   BP: 110/70   Pulse: 64   Temp: 96.9 °F (36.1 °C)   SpO2: 94%   Weight: 73.5 kg (162 lb)   Height: 167.6 cm (66\")     Body mass index is 26.15 kg/m².  Physical Exam   Constitutional: She is oriented to person, place, and time. She appears well-developed and well-nourished.   HENT:   Head: Normocephalic and atraumatic.   Eyes: Pupils are equal, round, and reactive to light. Conjunctivae and EOM are normal.   Neck: Neck supple. No thyromegaly present.   Cardiovascular: Normal rate and regular rhythm.   No murmur heard.  Pulmonary/Chest: Effort normal and breath sounds normal. She has no wheezes.   Abdominal: Soft.   Musculoskeletal: Normal range of motion.   Neurological: She is alert and oriented to person, place, and time. No cranial nerve deficit.   Skin: Skin is warm and dry.   Psychiatric: She has a normal mood and affect.       Office Visit on 06/15/2020   Component Date Value Ref Range Status   • Hemoglobin A1C 06/15/2020 6.6  % Final          Assessment/Plan   Valery was seen today for hypertension and diabetes.    Diagnoses and all orders for this visit:    Type 2 diabetes mellitus without complication, without long-term current use of insulin (CMS/[facility])  -     POC Glycosylated Hemoglobin (Hb A1C)  -     Comprehensive Metabolic Panel  -     Lipid Panel    Essential " hypertension    Hyperlipidemia, unspecified hyperlipidemia type  -     Comprehensive Metabolic Panel  -     Lipid Panel               There are no Patient Instructions on file for this visit.

## 2020-06-16 LAB
ALBUMIN SERPL-MCNC: 4.4 G/DL (ref 3.5–5.2)
ALBUMIN/GLOB SERPL: 1.9 G/DL
ALP SERPL-CCNC: 124 U/L (ref 39–117)
ALT SERPL-CCNC: 11 U/L (ref 1–33)
AST SERPL-CCNC: 12 U/L (ref 1–32)
BILIRUB SERPL-MCNC: 0.4 MG/DL (ref 0.2–1.2)
BUN SERPL-MCNC: 17 MG/DL (ref 8–23)
BUN/CREAT SERPL: 25.8 (ref 7–25)
CALCIUM SERPL-MCNC: 9.1 MG/DL (ref 8.6–10.5)
CHLORIDE SERPL-SCNC: 105 MMOL/L (ref 98–107)
CHOLEST SERPL-MCNC: 158 MG/DL (ref 0–200)
CO2 SERPL-SCNC: 25.7 MMOL/L (ref 22–29)
CREAT SERPL-MCNC: 0.66 MG/DL (ref 0.57–1)
GLOBULIN SER CALC-MCNC: 2.3 GM/DL
GLUCOSE SERPL-MCNC: 165 MG/DL (ref 65–99)
HDLC SERPL-MCNC: 52 MG/DL (ref 40–60)
LDLC SERPL CALC-MCNC: 76 MG/DL (ref 0–100)
POTASSIUM SERPL-SCNC: 4.2 MMOL/L (ref 3.5–5.2)
PROT SERPL-MCNC: 6.7 G/DL (ref 6–8.5)
SODIUM SERPL-SCNC: 141 MMOL/L (ref 136–145)
TRIGL SERPL-MCNC: 151 MG/DL (ref 0–150)
VLDLC SERPL CALC-MCNC: 30.2 MG/DL

## 2020-08-05 RX ORDER — SITAGLIPTIN 100 MG/1
TABLET, FILM COATED ORAL
Qty: 90 TABLET | Refills: 0 | Status: SHIPPED | OUTPATIENT
Start: 2020-08-05 | End: 2020-11-13

## 2020-09-21 ENCOUNTER — OFFICE VISIT (OUTPATIENT)
Dept: FAMILY MEDICINE CLINIC | Facility: CLINIC | Age: 70
End: 2020-09-21

## 2020-09-21 VITALS
TEMPERATURE: 96.9 F | SYSTOLIC BLOOD PRESSURE: 122 MMHG | DIASTOLIC BLOOD PRESSURE: 70 MMHG | OXYGEN SATURATION: 63 % | WEIGHT: 163.6 LBS | HEART RATE: 63 BPM | BODY MASS INDEX: 26.29 KG/M2 | HEIGHT: 66 IN

## 2020-09-21 DIAGNOSIS — Z23 INFLUENZA VACCINE NEEDED: ICD-10-CM

## 2020-09-21 DIAGNOSIS — Z23 NEED FOR PNEUMOCOCCAL VACCINE: ICD-10-CM

## 2020-09-21 DIAGNOSIS — E78.5 HYPERLIPIDEMIA, UNSPECIFIED HYPERLIPIDEMIA TYPE: ICD-10-CM

## 2020-09-21 DIAGNOSIS — E11.9 TYPE 2 DIABETES MELLITUS WITHOUT COMPLICATION, WITHOUT LONG-TERM CURRENT USE OF INSULIN (HCC): Primary | ICD-10-CM

## 2020-09-21 DIAGNOSIS — Z12.31 ENCOUNTER FOR SCREENING MAMMOGRAM FOR MALIGNANT NEOPLASM OF BREAST: ICD-10-CM

## 2020-09-21 DIAGNOSIS — I10 ESSENTIAL HYPERTENSION: ICD-10-CM

## 2020-09-21 LAB — HBA1C MFR BLD: 6.8 %

## 2020-09-21 PROCEDURE — G0439 PPPS, SUBSEQ VISIT: HCPCS | Performed by: FAMILY MEDICINE

## 2020-09-21 PROCEDURE — 83036 HEMOGLOBIN GLYCOSYLATED A1C: CPT | Performed by: FAMILY MEDICINE

## 2020-09-21 PROCEDURE — 90670 PCV13 VACCINE IM: CPT | Performed by: FAMILY MEDICINE

## 2020-09-21 PROCEDURE — G0008 ADMIN INFLUENZA VIRUS VAC: HCPCS | Performed by: FAMILY MEDICINE

## 2020-09-21 PROCEDURE — G0009 ADMIN PNEUMOCOCCAL VACCINE: HCPCS | Performed by: FAMILY MEDICINE

## 2020-09-21 PROCEDURE — 90694 VACC AIIV4 NO PRSRV 0.5ML IM: CPT | Performed by: FAMILY MEDICINE

## 2020-09-21 NOTE — PROGRESS NOTES
The ABCs of the Annual Wellness Visit  Subsequent Medicare Wellness Visit    Chief Complaint   Patient presents with   • Medicare Wellness-subsequent   • Diabetes       Subjective   History of Present Illness:  Valery Maynard is a 70 y.o. female who presents for a Subsequent Medicare Wellness Visit.  She is compliant with her diabetes regimen and has no complaints today.    HEALTH RISK ASSESSMENT    Recent Hospitalizations:  No hospitalization(s) within the last year.    Current Medical Providers:  Patient Care Team:  Kehrer, Meredith Lea, MD as PCP - General (Family Medicine)  Kehrer, Meredith Lea, MD as PCP - Claims Attributed    Smoking Status:  Social History     Tobacco Use   Smoking Status Former Smoker   Smokeless Tobacco Never Used   Tobacco Comment    QUIT 25 YRS AGO-was 1 ppd for 10-15 years       Alcohol Consumption:  Social History     Substance and Sexual Activity   Alcohol Use Yes    Comment: RARE       Depression Screen:   PHQ-2/PHQ-9 Depression Screening 9/21/2020   Little interest or pleasure in doing things 0   Feeling down, depressed, or hopeless 0   Total Score 0       Fall Risk Screen:  AGUSTINA Fall Risk Assessment was completed, and patient is at LOW risk for falls.Assessment completed on:9/21/2020    Health Habits and Functional and Cognitive Screening:  Functional & Cognitive Status 9/21/2020   Do you have difficulty preparing food and eating? No   Do you have difficulty bathing yourself, getting dressed or grooming yourself? No   Do you have difficulty using the toilet? No   Do you have difficulty moving around from place to place? No   Do you have trouble with steps or getting out of a bed or a chair? No   Current Diet Diabetic Diet   Dental Exam Up to date   Eye Exam Up to date   Exercise (times per week) 4 times per week   Current Exercise Activities Include Walking   Do you need help using the phone?  No   Are you deaf or do you have serious difficulty hearing?  No   Do you need help with  transportation? No   Do you need help shopping? No   Do you need help preparing meals?  No   Do you need help with housework?  No   Do you need help with laundry? No   Do you need help taking your medications? No   Do you need help managing money? No   Do you ever drive or ride in a car without wearing a seat belt? No   Have you felt unusual stress, anger or loneliness in the last month? Yes   Who do you live with? Spouse   If you need help, do you have trouble finding someone available to you? No   Have you been bothered in the last four weeks by sexual problems? No   Do you have difficulty concentrating, remembering or making decisions? No         Does the patient have evidence of cognitive impairment? No    Asprin use counseling:Does not need ASA (and currently is not on it)    Age-appropriate Screening Schedule:  Refer to the list below for future screening recommendations based on patient's age, sex and/or medical conditions. Orders for these recommended tests are listed in the plan section. The patient has been provided with a written plan.    Health Maintenance   Topic Date Due   • ZOSTER VACCINE (1 of 2) 09/17/1999   • MAMMOGRAM  10/06/2019   • INFLUENZA VACCINE  08/01/2020   • TDAP/TD VACCINES (1 - Tdap) 09/21/2021 (Originally 9/17/1969)   • URINE MICROALBUMIN  10/31/2020   • HEMOGLOBIN A1C  12/15/2020   • DIABETIC EYE EXAM  12/17/2020   • LIPID PANEL  06/15/2021   • DIABETIC FOOT EXAM  09/21/2021   • COLONOSCOPY  08/31/2022          The following portions of the patient's history were reviewed and updated as appropriate: allergies, current medications, past family history, past medical history, past social history, past surgical history and problem list.    Outpatient Medications Prior to Visit   Medication Sig Dispense Refill   • atorvastatin (LIPITOR) 20 MG tablet Take 20 mg by mouth Daily.     • cetirizine (zyrTEC) 10 MG tablet Take 10 mg by mouth Daily.     • clobetasol (TEMOVATE) 0.05 % external  solution Apply  topically to the appropriate area as directed 2 (Two) Times a Day As Needed (itch). 50 mL 0   • irbesartan (AVAPRO) 300 MG tablet Take 300 mg by mouth Every Night.     • JANUVIA 100 MG tablet TAKE ONE TABLET BY MOUTH DAILY BLOOD SUGAR 90 tablet 0   • ketoconazole (NIZORAL) 2 % shampoo Apply 1 application topically to the appropriate area as directed 2 (Two) Times a Week.     • metFORMIN ER (GLUCOPHAGE-XR) 500 MG 24 hr tablet Take 3 tablets by mouth Every Evening. 270 tablet 3   • vitamin B-12 (CYANOCOBALAMIN) 1000 MCG tablet Take 1,000 mcg by mouth Every Other Day.       No facility-administered medications prior to visit.        Patient Active Problem List   Diagnosis   • Positive colorectal cancer screening using DNA-based stool test   • Renal calculus, left   • Hypertension   • Hyperlipidemia   • Type 2 diabetes mellitus without complication, without long-term current use of insulin (CMS/[facility])   • Seborrheic dermatitis of scalp       Advanced Care Planning:  ACP discussion was held with the patient during this visit. Patient does not have an advance directive, information provided.    Review of Systems   Constitutional: Negative for chills, fatigue and fever.   HENT: Negative for congestion, rhinorrhea and sore throat.    Respiratory: Negative for cough and shortness of breath.    Cardiovascular: Negative for chest pain and leg swelling.   Gastrointestinal: Negative for abdominal pain.   Endocrine: Negative for polydipsia and polyuria.   Genitourinary: Negative for dysuria.   Musculoskeletal: Negative for arthralgias and myalgias.   Skin: Negative for rash.   Neurological: Negative for dizziness.   Hematological: Does not bruise/bleed easily.   Psychiatric/Behavioral: Negative for sleep disturbance.       Compared to one year ago, the patient feels her physical health is the same.  Compared to one year ago, the patient feels her mental health is the same.    Reviewed chart for potential of high  "risk medication in the elderly: yes  Reviewed chart for potential of harmful drug interactions in the elderly:yes    Objective         Vitals:    09/21/20 0902   BP: 122/70   Pulse: 63   Temp: 96.9 °F (36.1 °C)   SpO2: (!) 63%   Weight: 74.2 kg (163 lb 9.6 oz)   Height: 167.6 cm (66\")   PainSc: 0-No pain       Body mass index is 26.41 kg/m².  Discussed the patient's BMI with her. The BMI is in the acceptable range.    Physical Exam  Constitutional:       Appearance: She is well-developed.   HENT:      Head: Normocephalic and atraumatic.      Right Ear: Tympanic membrane, ear canal and external ear normal.      Left Ear: Tympanic membrane, ear canal and external ear normal.      Nose: Nose normal.      Mouth/Throat:      Mouth: Mucous membranes are moist.      Pharynx: Oropharynx is clear.   Eyes:      Conjunctiva/sclera: Conjunctivae normal.      Pupils: Pupils are equal, round, and reactive to light.   Neck:      Musculoskeletal: Neck supple.      Thyroid: No thyromegaly.   Cardiovascular:      Rate and Rhythm: Normal rate and regular rhythm.      Heart sounds: No murmur.   Pulmonary:      Effort: Pulmonary effort is normal.      Breath sounds: Normal breath sounds. No wheezing.   Abdominal:      General: Abdomen is flat. Bowel sounds are normal.      Palpations: Abdomen is soft. There is no mass.      Tenderness: There is no abdominal tenderness.   Musculoskeletal: Normal range of motion.   Skin:     General: Skin is warm and dry.   Neurological:      Mental Status: She is alert and oriented to person, place, and time.      Cranial Nerves: No cranial nerve deficit.         Lab Results   Component Value Date    HGBA1C 6.8 09/21/2020        Assessment/Plan   Medicare Risks and Personalized Health Plan  CMS Preventative Services Quick Reference  Immunizations Discussed/Encouraged (specific immunizations; Influenza, Prevnar and Shingrix )    The above risks/problems have been discussed with the patient.  Pertinent " information has been shared with the patient in the After Visit Summary.  Follow up plans and orders are seen below in the Assessment/Plan Section.    Diagnoses and all orders for this visit:    1. Type 2 diabetes mellitus without complication, without long-term current use of insulin (CMS/[facility]) (Primary)  -     POC Glycosylated Hemoglobin (Hb A1C)    2. Encounter for screening mammogram for malignant neoplasm of breast  -     Mammo Screening Digital Tomosynthesis Bilateral With CAD; Future    3. Need for pneumococcal vaccine  -     Pneumococcal Conjugate Vaccine 13-Valent All    4. Influenza vaccine needed  -     Fluad Quad 65+ yrs ([id number])    5. Essential hypertension    6. Hyperlipidemia, unspecified hyperlipidemia type      Follow Up:  Return in about 3 months (around 12/21/2020) for Recheck dm.     An After Visit Summary and PPPS were given to the patient.

## 2020-09-29 DIAGNOSIS — Z12.31 ENCOUNTER FOR SCREENING MAMMOGRAM FOR MALIGNANT NEOPLASM OF BREAST: ICD-10-CM

## 2020-10-04 DIAGNOSIS — I10 ESSENTIAL (PRIMARY) HYPERTENSION: ICD-10-CM

## 2020-10-04 DIAGNOSIS — E11.9 TYPE 2 DIABETES MELLITUS WITHOUT COMPLICATIONS (HCC): ICD-10-CM

## 2020-10-05 RX ORDER — ATORVASTATIN CALCIUM 20 MG/1
TABLET, FILM COATED ORAL
Qty: 90 TABLET | Refills: 1 | Status: SHIPPED | OUTPATIENT
Start: 2020-10-05 | End: 2021-06-22 | Stop reason: SDUPTHER

## 2020-10-05 RX ORDER — IRBESARTAN 300 MG/1
TABLET ORAL
Qty: 90 TABLET | Refills: 1 | Status: SHIPPED | OUTPATIENT
Start: 2020-10-05 | End: 2021-01-28

## 2020-11-13 DIAGNOSIS — E11.9 TYPE 2 DIABETES MELLITUS WITHOUT COMPLICATIONS (HCC): ICD-10-CM

## 2020-11-13 RX ORDER — SITAGLIPTIN 100 MG/1
TABLET, FILM COATED ORAL
Qty: 90 TABLET | Refills: 0 | Status: SHIPPED | OUTPATIENT
Start: 2020-11-13 | End: 2021-03-02

## 2020-12-22 ENCOUNTER — OFFICE VISIT (OUTPATIENT)
Dept: FAMILY MEDICINE CLINIC | Facility: CLINIC | Age: 70
End: 2020-12-22

## 2020-12-22 VITALS
TEMPERATURE: 96.4 F | BODY MASS INDEX: 26.07 KG/M2 | WEIGHT: 162.2 LBS | HEIGHT: 66 IN | HEART RATE: 67 BPM | DIASTOLIC BLOOD PRESSURE: 70 MMHG | SYSTOLIC BLOOD PRESSURE: 130 MMHG | OXYGEN SATURATION: 97 %

## 2020-12-22 DIAGNOSIS — I10 ESSENTIAL HYPERTENSION: ICD-10-CM

## 2020-12-22 DIAGNOSIS — E11.9 TYPE 2 DIABETES MELLITUS WITHOUT COMPLICATION, WITHOUT LONG-TERM CURRENT USE OF INSULIN (HCC): Primary | ICD-10-CM

## 2020-12-22 DIAGNOSIS — Z11.59 NEED FOR HEPATITIS C SCREENING TEST: ICD-10-CM

## 2020-12-22 DIAGNOSIS — E53.8 LOW VITAMIN B12 LEVEL: ICD-10-CM

## 2020-12-22 DIAGNOSIS — E78.5 HYPERLIPIDEMIA, UNSPECIFIED HYPERLIPIDEMIA TYPE: ICD-10-CM

## 2020-12-22 LAB
GLUCOSE BLDC GLUCOMTR-MCNC: 137 MG/DL (ref 70–130)
HBA1C MFR BLD: 5.9 %
POC CREATININE URINE: 200
POC MICROALBUMIN URINE: 80

## 2020-12-22 PROCEDURE — 82044 UR ALBUMIN SEMIQUANTITATIVE: CPT | Performed by: FAMILY MEDICINE

## 2020-12-22 PROCEDURE — 83036 HEMOGLOBIN GLYCOSYLATED A1C: CPT | Performed by: FAMILY MEDICINE

## 2020-12-22 PROCEDURE — 82962 GLUCOSE BLOOD TEST: CPT | Performed by: FAMILY MEDICINE

## 2020-12-22 PROCEDURE — 99213 OFFICE O/P EST LOW 20 MIN: CPT | Performed by: FAMILY MEDICINE

## 2020-12-22 NOTE — PROGRESS NOTES
Subjective   Valery Maynard is a 70 y.o. female.     Chief Complaint   Patient presents with   • Diabetes   • Hypertension   • Hyperlipidemia        Patient presents for her routine checkup on her diabetes hypertension dyslipidemia.  She has no acute complaints today is compliant with her medications.           The following portions of the patient's history were reviewed and updated as appropriate: allergies, current medications, past family history, past medical history, past social history, past surgical history and problem list.    Past Medical History:   Diagnosis Date   • Allergic conjunctivitis    • Arthritis     R FOOT   • Back pain, acute    • Contusion of right knee    • Cough    • Diabetes mellitus (CMS/[facility])    • Diabetes mellitus type 2, controlled (CMS/HCC)    • Encounter for preventive health examination    • Fatigue    • Former smoker     quit smoking 30 years ago, was 1 ppd for 10-15 years   • Hematuria    • History of kidney stones    • History of nephrolithiasis    • History of urinary tract infection     History of Acute UTI   • Hyperlipidemia    • Hypertension    • Kidney stone    • Menopause    • Microalbuminuria    • Murmur    • Muscle cramp    • Need for immunization against influenza    • Need for influenza vaccination    • Need for pneumococcal vaccine    • Peripheral neuropathy    • Psoriasis    • Rash     CHIN WENT TO DERMATOLOGIST, GAVE PATIENT PRESCRIPTION CREAM   • Screening for malignant neoplasm of colon    • Visit for screening mammogram    • Vitamin B12 deficiency    • Vitamin D deficiency        Past Surgical History:   Procedure Laterality Date   • COLONOSCOPY N/A 8/31/2017    Procedure: COLONOSCOPY TO CECUM WITH COLD BIOPSY POLYPECTOMIES;  Surgeon: Dimitri Olivera MD;  Location: [facility] ENDOSCOPY;  Service:    • EXTRACORPOREAL SHOCK WAVE LITHOTRIPSY (ESWL) Left 9/28/2018    Procedure: LT EXTRACORPOREAL SHOCKWAVE LITHOTRIPSY AND CYSTOSCOPY;  Surgeon: Ye Moreau MD;   Location: [facility];  Service: Urology   • KIDNEY STONE SURGERY         Family History   Problem Relation Age of Onset   • Coronary artery disease Mother    • Arthritis Father    • Diabetes Father    • No Known Problems Other    • Malig Hyperthermia Neg Hx        Social History     Socioeconomic History   • Marital status:      Spouse name: Not on file   • Number of children: Not on file   • Years of education: Not on file   • Highest education level: Not on file   Tobacco Use   • Smoking status: Former Smoker   • Smokeless tobacco: Never Used   • Tobacco comment: QUIT 25 YRS AGO-was 1 ppd for 10-15 years   Substance and Sexual Activity   • Alcohol use: Yes     Comment: RARE   • Drug use: No   • Sexual activity: Defer         Current Outpatient Medications:   •  atorvastatin (LIPITOR) 20 MG tablet, TAKE ONE TABLET BY MOUTH DAILY, Disp: 90 tablet, Rfl: 1  •  cetirizine (zyrTEC) 10 MG tablet, Take 10 mg by mouth Daily., Disp: , Rfl:   •  clobetasol (TEMOVATE) 0.05 % external solution, Apply  topically to the appropriate area as directed 2 (Two) Times a Day As Needed (itch)., Disp: 50 mL, Rfl: 0  •  irbesartan (AVAPRO) 300 MG tablet, TAKE ONE TABLET BY MOUTH DAILY, Disp: 90 tablet, Rfl: 1  •  Januvia 100 MG tablet, TAKE ONE TABLET BY MOUTH DAILY BLOOD SUGAR, Disp: 90 tablet, Rfl: 0  •  ketoconazole (NIZORAL) 2 % shampoo, Apply 1 application topically to the appropriate area as directed 2 (Two) Times a Week., Disp: , Rfl:   •  metFORMIN ER (GLUCOPHAGE-XR) 500 MG 24 hr tablet, Take 3 tablets by mouth Every Evening., Disp: 270 tablet, Rfl: 3  •  vitamin B-12 (CYANOCOBALAMIN) 1000 MCG tablet, Take 1,000 mcg by mouth Every Other Day., Disp: , Rfl:     Review of Systems   Constitutional: Negative for chills, fatigue and fever.   HENT: Negative for congestion, rhinorrhea and sore throat.    Respiratory: Negative for cough and shortness of breath.    Cardiovascular: Negative for chest pain and leg swelling.  "  Gastrointestinal: Negative for abdominal pain.   Endocrine: Negative for polydipsia and polyuria.   Genitourinary: Negative for dysuria.   Musculoskeletal: Negative for arthralgias and myalgias.   Skin: Negative for rash.   Neurological: Negative for dizziness.   Hematological: Does not bruise/bleed easily.   Psychiatric/Behavioral: Negative for sleep disturbance.       Objective   Vitals:    12/22/20 0903 12/22/20 0944   BP: 142/66 130/70   Pulse: 67    Temp: 96.4 °F (35.8 °C)    SpO2: 97%    Weight: 73.6 kg (162 lb 3.2 oz)    Height: 167.6 cm (66\")      Body mass index is 26.18 kg/m².  Physical Exam  Constitutional:       General: She is not in acute distress.     Appearance: Normal appearance. She is well-developed.   HENT:      Head: Normocephalic and atraumatic.      Right Ear: Tympanic membrane, ear canal and external ear normal.      Left Ear: Tympanic membrane, ear canal and external ear normal.      Mouth/Throat:      Mouth: Mucous membranes are moist.      Pharynx: Oropharynx is clear.   Eyes:      Conjunctiva/sclera: Conjunctivae normal.      Pupils: Pupils are equal, round, and reactive to light.   Neck:      Musculoskeletal: Neck supple.      Thyroid: No thyromegaly.   Cardiovascular:      Rate and Rhythm: Normal rate and regular rhythm.      Heart sounds: No murmur.   Pulmonary:      Effort: Pulmonary effort is normal.      Breath sounds: Normal breath sounds. No wheezing.   Musculoskeletal: Normal range of motion.   Lymphadenopathy:      Cervical: No cervical adenopathy.   Skin:     General: Skin is warm and dry.   Neurological:      Mental Status: She is alert and oriented to person, place, and time.   Psychiatric:         Mood and Affect: Mood normal.         Behavior: Behavior normal.         Office Visit on 12/22/2020   Component Date Value Ref Range Status   • Hemoglobin A1C 12/22/2020 5.9  % Final   • Glucose 12/22/2020 137* 70 - 130 mg/dL Final          Assessment/Plan   Diagnoses and all " orders for this visit:    1. Type 2 diabetes mellitus without complication, without long-term current use of insulin (CMS/HCC) (Primary)  -     POC Glycosylated Hemoglobin (Hb A1C)  -     POC Microalbumin  -     POC Glucose  -     TSH  -     CBC & Differential  -     Comprehensive Metabolic Panel  -     Lipid Panel    2. Need for hepatitis C screening test  -     Hepatitis C Antibody    3. Low vitamin B12 level  -     Vitamin B12    4. Essential hypertension    5. Hyperlipidemia, unspecified hyperlipidemia type               Patient Instructions   Monitor blood pressure at home and follow-up sooner if it runs greater than 140/90.

## 2020-12-23 LAB
ALBUMIN SERPL-MCNC: 4.2 G/DL (ref 3.5–5.2)
ALBUMIN/GLOB SERPL: 1.5 G/DL
ALP SERPL-CCNC: 117 U/L (ref 39–117)
ALT SERPL-CCNC: 12 U/L (ref 1–33)
AST SERPL-CCNC: 10 U/L (ref 1–32)
BASOPHILS # BLD AUTO: 0.03 10*3/MM3 (ref 0–0.2)
BASOPHILS NFR BLD AUTO: 0.7 % (ref 0–1.5)
BILIRUB SERPL-MCNC: 0.5 MG/DL (ref 0–1.2)
BUN SERPL-MCNC: 17 MG/DL (ref 8–23)
BUN/CREAT SERPL: 23.9 (ref 7–25)
CALCIUM SERPL-MCNC: 9.1 MG/DL (ref 8.6–10.5)
CHLORIDE SERPL-SCNC: 104 MMOL/L (ref 98–107)
CHOLEST SERPL-MCNC: 159 MG/DL (ref 0–200)
CO2 SERPL-SCNC: 25.6 MMOL/L (ref 22–29)
CREAT SERPL-MCNC: 0.71 MG/DL (ref 0.57–1)
EOSINOPHIL # BLD AUTO: 0.27 10*3/MM3 (ref 0–0.4)
EOSINOPHIL NFR BLD AUTO: 5.9 % (ref 0.3–6.2)
ERYTHROCYTE [DISTWIDTH] IN BLOOD BY AUTOMATED COUNT: 11.9 % (ref 12.3–15.4)
GLOBULIN SER CALC-MCNC: 2.8 GM/DL
GLUCOSE SERPL-MCNC: 134 MG/DL (ref 65–99)
HCT VFR BLD AUTO: 42.5 % (ref 34–46.6)
HCV AB S/CO SERPL IA: <0.1 S/CO RATIO (ref 0–0.9)
HDLC SERPL-MCNC: 55 MG/DL (ref 40–60)
HGB BLD-MCNC: 14.3 G/DL (ref 12–15.9)
IMM GRANULOCYTES # BLD AUTO: 0.02 10*3/MM3 (ref 0–0.05)
IMM GRANULOCYTES NFR BLD AUTO: 0.4 % (ref 0–0.5)
LDLC SERPL CALC-MCNC: 75 MG/DL (ref 0–100)
LYMPHOCYTES # BLD AUTO: 1.38 10*3/MM3 (ref 0.7–3.1)
LYMPHOCYTES NFR BLD AUTO: 30.1 % (ref 19.6–45.3)
MCH RBC QN AUTO: 30.2 PG (ref 26.6–33)
MCHC RBC AUTO-ENTMCNC: 33.6 G/DL (ref 31.5–35.7)
MCV RBC AUTO: 89.9 FL (ref 79–97)
MONOCYTES # BLD AUTO: 0.44 10*3/MM3 (ref 0.1–0.9)
MONOCYTES NFR BLD AUTO: 9.6 % (ref 5–12)
NEUTROPHILS # BLD AUTO: 2.45 10*3/MM3 (ref 1.7–7)
NEUTROPHILS NFR BLD AUTO: 53.3 % (ref 42.7–76)
NRBC BLD AUTO-RTO: 0 /100 WBC (ref 0–0.2)
PLATELET # BLD AUTO: 146 10*3/MM3 (ref 140–450)
POTASSIUM SERPL-SCNC: 4.5 MMOL/L (ref 3.5–5.2)
PROT SERPL-MCNC: 7 G/DL (ref 6–8.5)
RBC # BLD AUTO: 4.73 10*6/MM3 (ref 3.77–5.28)
SODIUM SERPL-SCNC: 141 MMOL/L (ref 136–145)
TRIGL SERPL-MCNC: 173 MG/DL (ref 0–150)
TSH SERPL DL<=0.005 MIU/L-ACNC: 1.91 UIU/ML (ref 0.27–4.2)
VIT B12 SERPL-MCNC: 1034 PG/ML (ref 211–946)
VLDLC SERPL CALC-MCNC: 29 MG/DL (ref 5–40)
WBC # BLD AUTO: 4.59 10*3/MM3 (ref 3.4–10.8)

## 2021-01-27 DIAGNOSIS — I10 ESSENTIAL (PRIMARY) HYPERTENSION: ICD-10-CM

## 2021-01-28 RX ORDER — IRBESARTAN 300 MG/1
TABLET ORAL
Qty: 90 TABLET | Refills: 0 | Status: SHIPPED | OUTPATIENT
Start: 2021-01-28 | End: 2021-06-22 | Stop reason: SDUPTHER

## 2021-03-02 DIAGNOSIS — E11.9 TYPE 2 DIABETES MELLITUS WITHOUT COMPLICATIONS (HCC): ICD-10-CM

## 2021-03-02 RX ORDER — SITAGLIPTIN 100 MG/1
TABLET, FILM COATED ORAL
Qty: 30 TABLET | Refills: 2 | Status: SHIPPED | OUTPATIENT
Start: 2021-03-02 | End: 2021-06-11

## 2021-06-11 DIAGNOSIS — E11.9 TYPE 2 DIABETES MELLITUS WITHOUT COMPLICATIONS (HCC): ICD-10-CM

## 2021-06-11 RX ORDER — SITAGLIPTIN 100 MG/1
TABLET, FILM COATED ORAL
Qty: 90 TABLET | Refills: 0 | Status: SHIPPED | OUTPATIENT
Start: 2021-06-11 | End: 2021-09-13

## 2021-06-22 ENCOUNTER — OFFICE VISIT (OUTPATIENT)
Dept: FAMILY MEDICINE CLINIC | Facility: CLINIC | Age: 71
End: 2021-06-22

## 2021-06-22 VITALS
BODY MASS INDEX: 26.26 KG/M2 | OXYGEN SATURATION: 98 % | SYSTOLIC BLOOD PRESSURE: 122 MMHG | WEIGHT: 163.4 LBS | HEIGHT: 66 IN | HEART RATE: 64 BPM | TEMPERATURE: 97.8 F | DIASTOLIC BLOOD PRESSURE: 76 MMHG

## 2021-06-22 DIAGNOSIS — E78.5 HYPERLIPIDEMIA, UNSPECIFIED HYPERLIPIDEMIA TYPE: Chronic | ICD-10-CM

## 2021-06-22 DIAGNOSIS — E11.9 TYPE 2 DIABETES MELLITUS WITHOUT COMPLICATIONS (HCC): ICD-10-CM

## 2021-06-22 DIAGNOSIS — I10 ESSENTIAL HYPERTENSION: Chronic | ICD-10-CM

## 2021-06-22 DIAGNOSIS — R80.9 MICROALBUMINURIA: ICD-10-CM

## 2021-06-22 DIAGNOSIS — I10 ESSENTIAL (PRIMARY) HYPERTENSION: ICD-10-CM

## 2021-06-22 DIAGNOSIS — E11.9 TYPE 2 DIABETES MELLITUS WITHOUT COMPLICATION, WITHOUT LONG-TERM CURRENT USE OF INSULIN (HCC): Primary | Chronic | ICD-10-CM

## 2021-06-22 DIAGNOSIS — G57.90 NEURITIS OF FOOT, UNSPECIFIED LATERALITY: ICD-10-CM

## 2021-06-22 LAB — HBA1C MFR BLD: 6.8 %

## 2021-06-22 PROCEDURE — 83036 HEMOGLOBIN GLYCOSYLATED A1C: CPT | Performed by: FAMILY MEDICINE

## 2021-06-22 PROCEDURE — 99214 OFFICE O/P EST MOD 30 MIN: CPT | Performed by: FAMILY MEDICINE

## 2021-06-22 RX ORDER — IRBESARTAN 300 MG/1
300 TABLET ORAL DAILY
Qty: 90 TABLET | Refills: 3 | Status: SHIPPED | OUTPATIENT
Start: 2021-06-22 | End: 2022-06-23

## 2021-06-22 RX ORDER — ATORVASTATIN CALCIUM 20 MG/1
20 TABLET, FILM COATED ORAL DAILY
Qty: 90 TABLET | Refills: 3 | Status: SHIPPED | OUTPATIENT
Start: 2021-06-22 | End: 2023-01-30 | Stop reason: SDUPTHER

## 2021-06-22 RX ORDER — METFORMIN HYDROCHLORIDE 500 MG/1
1500 TABLET, EXTENDED RELEASE ORAL EVERY EVENING
Qty: 270 TABLET | Refills: 3 | Status: SHIPPED | OUTPATIENT
Start: 2021-06-22 | End: 2022-08-08

## 2021-06-22 NOTE — PROGRESS NOTES
"Chief Complaint  Diabetes, Med Refill, and Hypertension    Subjective          Valery Maynard presents to [facility]  Patient presents for her 3-month diabetes and hypertension checkup.  She is doing well in general.  Compliant with medication.  She has the only complaints of some funny tingling in her feet at times.  She denies any numbness or pain.  She denies any chest pain shortness of breath.      Objective   Vital Signs:   /76   Pulse 64   Temp 97.8 °F (36.6 °C)   Ht 167.6 cm (66\")   Wt 74.1 kg (163 lb 6.4 oz)   SpO2 98%   BMI 26.37 kg/m²     Physical Exam  Constitutional:       General: She is not in acute distress.     Appearance: Normal appearance. She is well-developed.   HENT:      Head: Normocephalic and atraumatic.      Right Ear: External ear normal.      Left Ear: External ear normal.      Mouth/Throat:      Mouth: Mucous membranes are moist.      Pharynx: Oropharynx is clear.   Eyes:      Conjunctiva/sclera: Conjunctivae normal.      Pupils: Pupils are equal, round, and reactive to light.   Neck:      Thyroid: No thyromegaly.   Cardiovascular:      Rate and Rhythm: Normal rate and regular rhythm.      Heart sounds: No murmur heard.     Pulmonary:      Effort: Pulmonary effort is normal.      Breath sounds: Normal breath sounds. No wheezing.   Musculoskeletal:         General: Normal range of motion.      Cervical back: Neck supple.   Lymphadenopathy:      Cervical: No cervical adenopathy.   Skin:     General: Skin is warm and dry.   Neurological:      Mental Status: She is alert and oriented to person, place, and time.   Psychiatric:         Mood and Affect: Mood normal.         Behavior: Behavior normal.        Result Review :                 Assessment and Plan    Diagnoses and all orders for this visit:    1. Type 2 diabetes mellitus without complication, without long-term current use of insulin (CMS/[facility]) (Primary)  -     POC Glycosylated Hemoglobin (Hb " A1C)  -     TSH  -     Comprehensive Metabolic Panel  -     CBC & Differential  -     Lipid Panel  -     metFORMIN ER (GLUCOPHAGE-XR) 500 MG 24 hr tablet; Take 3 tablets by mouth Every Evening.  Dispense: 270 tablet; Refill: 3    2. Essential hypertension  -     TSH  -     Comprehensive Metabolic Panel  -     CBC & Differential  -     Lipid Panel    3. Hyperlipidemia, unspecified hyperlipidemia type  -     Comprehensive Metabolic Panel  -     Lipid Panel    4. Neuritis of foot, unspecified laterality  -     TSH  -     Vitamin B12    5. Microalbuminuria  -     Protein, Urine, 24 Hour - Urine, Clean Catch; Future    6. Type 2 diabetes mellitus without complications (CMS/[facility])  -     atorvastatin (LIPITOR) 20 MG tablet; Take 1 tablet by mouth Daily.  Dispense: 90 tablet; Refill: 3    7. Essential (primary) hypertension  -     irbesartan (AVAPRO) 300 MG tablet; Take 1 tablet by mouth Daily.  Dispense: 90 tablet; Refill: 3    Other orders  -     Cancel: POC Glycosylated Hemoglobin (Hb A1C)    A Beatties-well-controlled, refill medications and get monitoring labs  Hypertension-well-controlled  Hyperlipidemia-continue lipids  Neuritis-check TSH and B12  Microalbuminuria-check 24 urine protein    Follow Up   Return in about 6 months (around 12/22/2021) for Medicare Wellness, Diabetes Check.  Patient was given instructions and counseling regarding her condition or for health maintenance advice. Please see specific information pulled into the AVS if appropriate.

## 2021-06-23 LAB
ALBUMIN SERPL-MCNC: 4.3 G/DL (ref 3.5–5.2)
ALBUMIN/GLOB SERPL: 2.2 G/DL
ALP SERPL-CCNC: 124 U/L (ref 39–117)
ALT SERPL-CCNC: 13 U/L (ref 1–33)
AST SERPL-CCNC: 10 U/L (ref 1–32)
BASOPHILS # BLD AUTO: 0.03 10*3/MM3 (ref 0–0.2)
BASOPHILS NFR BLD AUTO: 0.6 % (ref 0–1.5)
BILIRUB SERPL-MCNC: 0.8 MG/DL (ref 0–1.2)
BUN SERPL-MCNC: 15 MG/DL (ref 8–23)
BUN/CREAT SERPL: 20.8 (ref 7–25)
CALCIUM SERPL-MCNC: 9.4 MG/DL (ref 8.6–10.5)
CHLORIDE SERPL-SCNC: 104 MMOL/L (ref 98–107)
CHOLEST SERPL-MCNC: 161 MG/DL (ref 0–200)
CO2 SERPL-SCNC: 30 MMOL/L (ref 22–29)
CREAT SERPL-MCNC: 0.72 MG/DL (ref 0.57–1)
EOSINOPHIL # BLD AUTO: 0.26 10*3/MM3 (ref 0–0.4)
EOSINOPHIL NFR BLD AUTO: 5 % (ref 0.3–6.2)
ERYTHROCYTE [DISTWIDTH] IN BLOOD BY AUTOMATED COUNT: 12.1 % (ref 12.3–15.4)
GLOBULIN SER CALC-MCNC: 2 GM/DL
GLUCOSE SERPL-MCNC: 145 MG/DL (ref 65–99)
HCT VFR BLD AUTO: 42.4 % (ref 34–46.6)
HDLC SERPL-MCNC: 60 MG/DL (ref 40–60)
HGB BLD-MCNC: 14.2 G/DL (ref 12–15.9)
IMM GRANULOCYTES # BLD AUTO: 0.01 10*3/MM3 (ref 0–0.05)
IMM GRANULOCYTES NFR BLD AUTO: 0.2 % (ref 0–0.5)
LDLC SERPL CALC-MCNC: 79 MG/DL (ref 0–100)
LYMPHOCYTES # BLD AUTO: 1.21 10*3/MM3 (ref 0.7–3.1)
LYMPHOCYTES NFR BLD AUTO: 23.2 % (ref 19.6–45.3)
MCH RBC QN AUTO: 31.6 PG (ref 26.6–33)
MCHC RBC AUTO-ENTMCNC: 33.5 G/DL (ref 31.5–35.7)
MCV RBC AUTO: 94.2 FL (ref 79–97)
MONOCYTES # BLD AUTO: 0.48 10*3/MM3 (ref 0.1–0.9)
MONOCYTES NFR BLD AUTO: 9.2 % (ref 5–12)
NEUTROPHILS # BLD AUTO: 3.22 10*3/MM3 (ref 1.7–7)
NEUTROPHILS NFR BLD AUTO: 61.8 % (ref 42.7–76)
NRBC BLD AUTO-RTO: 0 /100 WBC (ref 0–0.2)
PLATELET # BLD AUTO: 146 10*3/MM3 (ref 140–450)
POTASSIUM SERPL-SCNC: 4.3 MMOL/L (ref 3.5–5.2)
PROT SERPL-MCNC: 6.3 G/DL (ref 6–8.5)
RBC # BLD AUTO: 4.5 10*6/MM3 (ref 3.77–5.28)
SODIUM SERPL-SCNC: 143 MMOL/L (ref 136–145)
TRIGL SERPL-MCNC: 126 MG/DL (ref 0–150)
TSH SERPL DL<=0.005 MIU/L-ACNC: 1.1 UIU/ML (ref 0.27–4.2)
VIT B12 SERPL-MCNC: 1665 PG/ML (ref 211–946)
VLDLC SERPL CALC-MCNC: 22 MG/DL (ref 5–40)
WBC # BLD AUTO: 5.21 10*3/MM3 (ref 3.4–10.8)

## 2021-06-24 LAB
PROT 24H UR-MRATE: 176 MG/24HOURS (ref 0–150)
PROT UR-MCNC: 22 MG/DL

## 2021-09-13 DIAGNOSIS — E11.9 TYPE 2 DIABETES MELLITUS WITHOUT COMPLICATIONS (HCC): ICD-10-CM

## 2021-09-13 RX ORDER — SITAGLIPTIN 100 MG/1
TABLET, FILM COATED ORAL
Qty: 90 TABLET | Refills: 0 | Status: SHIPPED | OUTPATIENT
Start: 2021-09-13 | End: 2021-12-13

## 2021-10-26 ENCOUNTER — FLU SHOT (OUTPATIENT)
Dept: FAMILY MEDICINE CLINIC | Facility: CLINIC | Age: 71
End: 2021-10-26

## 2021-10-26 VITALS — TEMPERATURE: 96.9 F

## 2021-10-26 PROCEDURE — 90662 IIV NO PRSV INCREASED AG IM: CPT | Performed by: FAMILY MEDICINE

## 2021-10-26 PROCEDURE — G0008 ADMIN INFLUENZA VIRUS VAC: HCPCS | Performed by: FAMILY MEDICINE

## 2021-12-11 DIAGNOSIS — E11.9 TYPE 2 DIABETES MELLITUS WITHOUT COMPLICATIONS (HCC): ICD-10-CM

## 2021-12-13 RX ORDER — SITAGLIPTIN 100 MG/1
TABLET, FILM COATED ORAL
Qty: 90 TABLET | Refills: 0 | Status: SHIPPED | OUTPATIENT
Start: 2021-12-13 | End: 2022-03-29

## 2022-01-05 ENCOUNTER — OFFICE VISIT (OUTPATIENT)
Dept: FAMILY MEDICINE CLINIC | Facility: CLINIC | Age: 72
End: 2022-01-05

## 2022-01-05 VITALS
BODY MASS INDEX: 27.16 KG/M2 | WEIGHT: 169 LBS | SYSTOLIC BLOOD PRESSURE: 128 MMHG | HEART RATE: 84 BPM | HEIGHT: 66 IN | OXYGEN SATURATION: 99 % | TEMPERATURE: 98.6 F | DIASTOLIC BLOOD PRESSURE: 70 MMHG

## 2022-01-05 DIAGNOSIS — E53.8 LOW VITAMIN B12 LEVEL: ICD-10-CM

## 2022-01-05 DIAGNOSIS — E11.9 TYPE 2 DIABETES MELLITUS WITHOUT COMPLICATION, WITHOUT LONG-TERM CURRENT USE OF INSULIN: ICD-10-CM

## 2022-01-05 DIAGNOSIS — Z00.00 MEDICARE ANNUAL WELLNESS VISIT, SUBSEQUENT: Primary | ICD-10-CM

## 2022-01-05 DIAGNOSIS — E78.5 HYPERLIPIDEMIA, UNSPECIFIED HYPERLIPIDEMIA TYPE: ICD-10-CM

## 2022-01-05 DIAGNOSIS — I10 ESSENTIAL HYPERTENSION: ICD-10-CM

## 2022-01-05 LAB
EXPIRATION DATE: ABNORMAL
HBA1C MFR BLD: 7.1 %
Lab: ABNORMAL

## 2022-01-05 PROCEDURE — 3051F HG A1C>EQUAL 7.0%<8.0%: CPT | Performed by: FAMILY MEDICINE

## 2022-01-05 PROCEDURE — 83036 HEMOGLOBIN GLYCOSYLATED A1C: CPT | Performed by: FAMILY MEDICINE

## 2022-01-05 PROCEDURE — 1170F FXNL STATUS ASSESSED: CPT | Performed by: FAMILY MEDICINE

## 2022-01-05 PROCEDURE — 1159F MED LIST DOCD IN RCRD: CPT | Performed by: FAMILY MEDICINE

## 2022-01-05 PROCEDURE — G0439 PPPS, SUBSEQ VISIT: HCPCS | Performed by: FAMILY MEDICINE

## 2022-01-05 NOTE — PROGRESS NOTES
The ABCs of the Annual Wellness Visit  Subsequent Medicare Wellness Visit    Chief Complaint   Patient presents with   • Medicare Wellness-subsequent   • Diabetes   • Hypertension   • Hyperlipidemia      Subjective    History of Present Illness:  Valery Maynard is a 71 y.o. female who presents for a Subsequent Medicare Wellness Visit.  Patient presents for her diabetes hypertension and Medicare wellness checkup.  She is compliant with her medication.  She admits she can do better with her diet since the holidays.  She does not have any acute complaints today.    The following portions of the patient's history were reviewed and   updated as appropriate: allergies, current medications, past family history, past medical history, past social history, past surgical history and problem list.    Compared to one year ago, the patient feels her physical   health is the same.    Compared to one year ago, the patient feels her mental   health is the same.    Recent Hospitalizations:  She was not admitted to the hospital during the last year.       Current Medical Providers:  Patient Care Team:  Kehrer, Meredith Lea, MD as PCP - General (Family Medicine)    Outpatient Medications Prior to Visit   Medication Sig Dispense Refill   • atorvastatin (LIPITOR) 20 MG tablet Take 1 tablet by mouth Daily. 90 tablet 3   • cetirizine (zyrTEC) 10 MG tablet Take 10 mg by mouth Daily.     • clobetasol (TEMOVATE) 0.05 % external solution Apply  topically to the appropriate area as directed 2 (Two) Times a Day As Needed (itch). 50 mL 0   • irbesartan (AVAPRO) 300 MG tablet Take 1 tablet by mouth Daily. 90 tablet 3   • Januvia 100 MG tablet TAKE 1 TABLET BY MOUTH ONCE DAILY FOR BLOOD SUGAR 90 tablet 0   • ketoconazole (NIZORAL) 2 % shampoo Apply 1 application topically to the appropriate area as directed 2 (Two) Times a Week.     • metFORMIN ER (GLUCOPHAGE-XR) 500 MG 24 hr tablet Take 3 tablets by mouth Every Evening. 270 tablet 3   • vitamin  "B-12 (CYANOCOBALAMIN) 1000 MCG tablet Take 1,000 mcg by mouth Every Other Day.       No facility-administered medications prior to visit.       No opioid medication identified on active medication list. I have reviewed chart for other potential  high risk medication/s and harmful drug interactions in the elderly.          Aspirin is not on active medication list.  Aspirin use is not indicated based on review of current medical condition/s. Risk of harm outweighs potential benefits.  .    Patient Active Problem List   Diagnosis   • Positive colorectal cancer screening using DNA-based stool test   • Renal calculus, left   • Hypertension   • Hyperlipidemia   • Type 2 diabetes mellitus without complication, without long-term current use of insulin (HCC)   • Seborrheic dermatitis of scalp     Advance Care Planning  Advance Directive is not on file.  ACP discussion was held with the patient during this visit. Patient does not have an advance directive, information provided.    Review of Systems   Constitutional: Negative for chills, fatigue and fever.   HENT: Negative for congestion, ear pain and sore throat.    Eyes: Negative for visual disturbance.   Respiratory: Negative for cough and shortness of breath.    Cardiovascular: Negative for chest pain, palpitations and leg swelling.   Gastrointestinal: Negative for diarrhea, nausea and vomiting.   Endocrine: Negative.    Genitourinary: Negative for dysuria and frequency.   Skin: Negative.    Psychiatric/Behavioral: Negative for dysphoric mood and sleep disturbance. The patient is not nervous/anxious.         Objective    Vitals:    01/05/22 1002   BP: 128/70   Pulse: 84   Temp: 98.6 °F (37 °C)   SpO2: 99%   Weight: 76.7 kg (169 lb)   Height: 167.6 cm (66\")     BMI Readings from Last 1 Encounters:   01/05/22 27.28 kg/m²   BMI is above normal parameters. Recommendations include: exercise counseling and nutrition counseling    Does the patient have evidence of cognitive " impairment? No    Physical Exam  Vitals and nursing note reviewed.   Constitutional:       General: She is not in acute distress.     Appearance: Normal appearance. She is well-developed.   HENT:      Head: Normocephalic and atraumatic.      Right Ear: Tympanic membrane, ear canal and external ear normal.      Left Ear: Tympanic membrane, ear canal and external ear normal.      Nose: Nose normal.      Mouth/Throat:      Mouth: Mucous membranes are moist.      Pharynx: Oropharynx is clear. No oropharyngeal exudate or posterior oropharyngeal erythema.   Eyes:      Conjunctiva/sclera: Conjunctivae normal.      Pupils: Pupils are equal, round, and reactive to light.   Neck:      Thyroid: No thyromegaly.   Cardiovascular:      Rate and Rhythm: Normal rate and regular rhythm.      Heart sounds: No murmur heard.      Pulmonary:      Effort: Pulmonary effort is normal.      Breath sounds: Normal breath sounds. No wheezing.   Abdominal:      General: Abdomen is flat. Bowel sounds are normal. There is no distension.      Palpations: Abdomen is soft. There is no mass.      Tenderness: There is no abdominal tenderness.      Hernia: No hernia is present.   Musculoskeletal:         General: No swelling. Normal range of motion.      Cervical back: Normal range of motion and neck supple.      Right lower leg: No edema.      Left lower leg: No edema.   Lymphadenopathy:      Cervical: No cervical adenopathy.   Skin:     General: Skin is warm and dry.      Capillary Refill: Capillary refill takes less than 2 seconds.      Findings: No rash.   Neurological:      General: No focal deficit present.      Mental Status: She is alert and oriented to person, place, and time.      Cranial Nerves: No cranial nerve deficit.   Psychiatric:         Mood and Affect: Mood normal.         Behavior: Behavior normal.       Lab Results   Component Value Date    HGBA1C 7.1 01/05/2022            HEALTH RISK ASSESSMENT    Smoking Status:  Social History      Tobacco Use   Smoking Status Former Smoker   Smokeless Tobacco Never Used   Tobacco Comment    QUIT 25 YRS AGO-was 1 ppd for 10-15 years     Alcohol Consumption:  Social History     Substance and Sexual Activity   Alcohol Use Yes    Comment: RARE     Fall Risk Screen:    AGUSTINA Fall Risk Assessment was completed, and patient is at LOW risk for falls.Assessment completed on:1/5/2022    Depression Screening:  PHQ-2/PHQ-9 Depression Screening 1/5/2022   Little interest or pleasure in doing things 0   Feeling down, depressed, or hopeless 0   Total Score 0       Health Habits and Functional and Cognitive Screening:  Functional & Cognitive Status 1/5/2022   Do you have difficulty preparing food and eating? No   Do you have difficulty bathing yourself, getting dressed or grooming yourself? No   Do you have difficulty using the toilet? No   Do you have difficulty moving around from place to place? No   Do you have trouble with steps or getting out of a bed or a chair? No   Current Diet Well Balanced Diet   Dental Exam Up to date   Eye Exam Up to date   Exercise (times per week) 2 times per week   Current Exercises Include Walking   Current Exercise Activities Include -   Do you need help using the phone?  No   Are you deaf or do you have serious difficulty hearing?  No   Do you need help with transportation? No   Do you need help shopping? No   Do you need help preparing meals?  No   Do you need help with housework?  No   Do you need help with laundry? No   Do you need help taking your medications? No   Do you need help managing money? No   Do you ever drive or ride in a car without wearing a seat belt? No   Have you felt unusual stress, anger or loneliness in the last month? -   Who do you live with? -   If you need help, do you have trouble finding someone available to you? -   Have you been bothered in the last four weeks by sexual problems? -   Do you have difficulty concentrating, remembering or making decisions? -        Age-appropriate Screening Schedule:  Refer to the list below for future screening recommendations based on patient's age, sex and/or medical conditions. Orders for these recommended tests are listed in the plan section. The patient has been provided with a written plan.    Health Maintenance   Topic Date Due   • TDAP/TD VACCINES (1 - Tdap) Never done   • ZOSTER VACCINE (1 of 2) Never done   • DXA SCAN  10/05/2019   • DIABETIC FOOT EXAM  09/21/2021   • LIPID PANEL  06/22/2022   • URINE MICROALBUMIN  06/24/2022   • HEMOGLOBIN A1C  07/05/2022   • DIABETIC EYE EXAM  08/23/2022   • MAMMOGRAM  09/28/2022   • INFLUENZA VACCINE  Completed              Assessment/Plan   CMS Preventative Services Quick Reference  Risk Factors Identified During Encounter  None Identified  The above risks/problems have been discussed with the patient.  Follow up actions/plans if indicated are seen below in the Assessment/Plan Section.  Pertinent information has been shared with the patient in the After Visit Summary.    Diagnoses and all orders for this visit:    1. Medicare annual wellness visit, subsequent (Primary)    2. Type 2 diabetes mellitus without complication, without long-term current use of insulin (HCC)  -     POC Glycosylated Hemoglobin (Hb A1C)  -     Lipid Panel  -     CBC & Differential  -     Comprehensive Metabolic Panel  -     TSH    3. Essential hypertension  -     Lipid Panel  -     CBC & Differential  -     Comprehensive Metabolic Panel  -     TSH    4. Hyperlipidemia, unspecified hyperlipidemia type  -     Lipid Panel    5. Low vitamin B12 level  -     Vitamin B12    Since diabetes is not quite to goal, will do checkup in 3 months instead of 6 months.  Patient voiced understanding    Follow Up:   Return in about 3 months (around 4/5/2022).     An After Visit Summary and PPPS were made available to the patient.

## 2022-01-06 LAB
ALBUMIN SERPL-MCNC: 4.3 G/DL (ref 3.7–4.7)
ALBUMIN/GLOB SERPL: 1.8 {RATIO} (ref 1.2–2.2)
ALP SERPL-CCNC: 122 IU/L (ref 44–121)
ALT SERPL-CCNC: 13 IU/L (ref 0–32)
AST SERPL-CCNC: 13 IU/L (ref 0–40)
BASOPHILS # BLD AUTO: 0 X10E3/UL (ref 0–0.2)
BASOPHILS NFR BLD AUTO: 0 %
BILIRUB SERPL-MCNC: 0.7 MG/DL (ref 0–1.2)
BUN SERPL-MCNC: 16 MG/DL (ref 8–27)
BUN/CREAT SERPL: 21 (ref 12–28)
CALCIUM SERPL-MCNC: 9.4 MG/DL (ref 8.7–10.3)
CHLORIDE SERPL-SCNC: 104 MMOL/L (ref 96–106)
CHOLEST SERPL-MCNC: 171 MG/DL (ref 100–199)
CO2 SERPL-SCNC: 24 MMOL/L (ref 20–29)
CREAT SERPL-MCNC: 0.78 MG/DL (ref 0.57–1)
EOSINOPHIL # BLD AUTO: 0.2 X10E3/UL (ref 0–0.4)
EOSINOPHIL NFR BLD AUTO: 5 %
ERYTHROCYTE [DISTWIDTH] IN BLOOD BY AUTOMATED COUNT: 11.8 % (ref 11.7–15.4)
GLOBULIN SER CALC-MCNC: 2.4 G/DL (ref 1.5–4.5)
GLUCOSE SERPL-MCNC: 145 MG/DL (ref 65–99)
HCT VFR BLD AUTO: 39.7 % (ref 34–46.6)
HDLC SERPL-MCNC: 55 MG/DL
HGB BLD-MCNC: 13.1 G/DL (ref 11.1–15.9)
IMM GRANULOCYTES # BLD AUTO: 0 X10E3/UL (ref 0–0.1)
IMM GRANULOCYTES NFR BLD AUTO: 0 %
LDLC SERPL CALC-MCNC: 92 MG/DL (ref 0–99)
LYMPHOCYTES # BLD AUTO: 1.6 X10E3/UL (ref 0.7–3.1)
LYMPHOCYTES NFR BLD AUTO: 31 %
MCH RBC QN AUTO: 30.1 PG (ref 26.6–33)
MCHC RBC AUTO-ENTMCNC: 33 G/DL (ref 31.5–35.7)
MCV RBC AUTO: 91 FL (ref 79–97)
MONOCYTES # BLD AUTO: 0.5 X10E3/UL (ref 0.1–0.9)
MONOCYTES NFR BLD AUTO: 10 %
NEUTROPHILS # BLD AUTO: 2.8 X10E3/UL (ref 1.4–7)
NEUTROPHILS NFR BLD AUTO: 54 %
PLATELET # BLD AUTO: 153 X10E3/UL (ref 150–450)
POTASSIUM SERPL-SCNC: 4.3 MMOL/L (ref 3.5–5.2)
PROT SERPL-MCNC: 6.7 G/DL (ref 6–8.5)
RBC # BLD AUTO: 4.35 X10E6/UL (ref 3.77–5.28)
SODIUM SERPL-SCNC: 141 MMOL/L (ref 134–144)
TRIGL SERPL-MCNC: 137 MG/DL (ref 0–149)
TSH SERPL DL<=0.005 MIU/L-ACNC: 1.43 UIU/ML (ref 0.45–4.5)
VIT B12 SERPL-MCNC: 571 PG/ML (ref 232–1245)
VLDLC SERPL CALC-MCNC: 24 MG/DL (ref 5–40)
WBC # BLD AUTO: 5.1 X10E3/UL (ref 3.4–10.8)

## 2022-02-01 ENCOUNTER — TELEPHONE (OUTPATIENT)
Dept: FAMILY MEDICINE CLINIC | Facility: CLINIC | Age: 72
End: 2022-02-01

## 2022-02-01 DIAGNOSIS — E11.9 TYPE 2 DIABETES MELLITUS WITHOUT COMPLICATION, WITHOUT LONG-TERM CURRENT USE OF INSULIN: Primary | ICD-10-CM

## 2022-02-01 NOTE — TELEPHONE ENCOUNTER
----- Message from Valery Maynard sent at 2/1/2022  1:08 PM EST -----  Regarding: Diabetic Testing Meter  I would like to get a prescription sent to [facility] for a Freestyle Lite meter!  My old meter is worn out!

## 2022-02-04 RX ORDER — BLOOD-GLUCOSE METER
1 KIT MISCELLANEOUS DAILY
Qty: 1 EACH | Refills: 0 | Status: SHIPPED | OUTPATIENT
Start: 2022-02-04 | End: 2022-02-14 | Stop reason: SDUPTHER

## 2022-02-04 RX ORDER — BLOOD-GLUCOSE METER
KIT MISCELLANEOUS
Qty: 100 EACH | Refills: 3 | Status: SHIPPED | OUTPATIENT
Start: 2022-02-04 | End: 2022-02-09 | Stop reason: SDUPTHER

## 2022-02-09 DIAGNOSIS — E11.9 TYPE 2 DIABETES MELLITUS WITHOUT COMPLICATION, WITHOUT LONG-TERM CURRENT USE OF INSULIN: ICD-10-CM

## 2022-02-09 RX ORDER — BLOOD-GLUCOSE METER
KIT MISCELLANEOUS
Qty: 100 EACH | Refills: 3 | Status: SHIPPED | OUTPATIENT
Start: 2022-02-09

## 2022-02-14 DIAGNOSIS — E11.9 TYPE 2 DIABETES MELLITUS WITHOUT COMPLICATION, WITHOUT LONG-TERM CURRENT USE OF INSULIN: ICD-10-CM

## 2022-02-14 RX ORDER — BLOOD-GLUCOSE METER
1 KIT MISCELLANEOUS DAILY
Qty: 1 EACH | Refills: 0 | Status: SHIPPED | OUTPATIENT
Start: 2022-02-14

## 2022-03-28 DIAGNOSIS — E11.9 TYPE 2 DIABETES MELLITUS WITHOUT COMPLICATIONS: ICD-10-CM

## 2022-03-29 RX ORDER — SITAGLIPTIN 100 MG/1
TABLET, FILM COATED ORAL
Qty: 90 TABLET | Refills: 0 | Status: SHIPPED | OUTPATIENT
Start: 2022-03-29 | End: 2022-04-28 | Stop reason: SDUPTHER

## 2022-04-28 ENCOUNTER — OFFICE VISIT (OUTPATIENT)
Dept: FAMILY MEDICINE CLINIC | Facility: CLINIC | Age: 72
End: 2022-04-28

## 2022-04-28 VITALS
OXYGEN SATURATION: 99 % | TEMPERATURE: 97.5 F | BODY MASS INDEX: 27.16 KG/M2 | WEIGHT: 169 LBS | HEIGHT: 66 IN | HEART RATE: 67 BPM | DIASTOLIC BLOOD PRESSURE: 80 MMHG | SYSTOLIC BLOOD PRESSURE: 126 MMHG

## 2022-04-28 DIAGNOSIS — H91.93 DECREASED HEARING OF BOTH EARS: ICD-10-CM

## 2022-04-28 DIAGNOSIS — I10 ESSENTIAL HYPERTENSION: ICD-10-CM

## 2022-04-28 DIAGNOSIS — E78.5 HYPERLIPIDEMIA, UNSPECIFIED HYPERLIPIDEMIA TYPE: ICD-10-CM

## 2022-04-28 DIAGNOSIS — Z79.4 TYPE 2 DIABETES MELLITUS WITHOUT COMPLICATION, WITH LONG-TERM CURRENT USE OF INSULIN: Primary | ICD-10-CM

## 2022-04-28 DIAGNOSIS — E11.9 TYPE 2 DIABETES MELLITUS WITHOUT COMPLICATION, WITH LONG-TERM CURRENT USE OF INSULIN: Primary | ICD-10-CM

## 2022-04-28 DIAGNOSIS — E11.9 TYPE 2 DIABETES MELLITUS WITHOUT COMPLICATIONS: ICD-10-CM

## 2022-04-28 LAB
EXPIRATION DATE: ABNORMAL
HBA1C MFR BLD: 7.5 %
Lab: ABNORMAL

## 2022-04-28 PROCEDURE — 3051F HG A1C>EQUAL 7.0%<8.0%: CPT | Performed by: FAMILY MEDICINE

## 2022-04-28 PROCEDURE — 99213 OFFICE O/P EST LOW 20 MIN: CPT | Performed by: FAMILY MEDICINE

## 2022-04-28 PROCEDURE — 83036 HEMOGLOBIN GLYCOSYLATED A1C: CPT | Performed by: FAMILY MEDICINE

## 2022-04-28 NOTE — PROGRESS NOTES
"Chief Complaint  Diabetes, Hyperlipidemia, and Hypertension    Subjective          Valerymartha Maynard presents to [facility]  History of Present Illness    Diabetes  The patient reports her blood glucose has been high at home for 1 week. She reports her fasting blood glucose at home has been 184 to 194. She checks her blood glucose consistently once a day and checks it twice a day occasionally. She is taking Januvia and metformin 3 tablets at night.  She denies consuming sweats and sugary drinks. She has an occasional glass of wine. The patient reports she is not successful with weight loss. She states she takes approximately 10,000 steps per day. She admits to snacking at night on nuts and \"stuff\".     Decreased hearing  The patient reports decreased hearing for 6 months. She denies tinnitus. She takes Zyrtec as needed for congestion.     Objective    A review of systems was performed, and the pertinent positives are noted in the HPI.   Vital Signs:   /80   Pulse 67   Temp 97.5 °F (36.4 °C)   Ht 167.6 cm (66\")   Wt 76.7 kg (169 lb)   SpO2 99%   BMI 27.28 kg/m²     Physical Exam  Vitals reviewed.   Constitutional:       Appearance: Normal appearance. She is not ill-appearing.   HENT:      Right Ear: External ear normal.      Left Ear: External ear normal.      Ears:      Comments: Slight clear fluid behind bilateral TMs with some clear postnasal drainage.     Nose: Nose normal.   Eyes:      Extraocular Movements: Extraocular movements intact.      Conjunctiva/sclera: Conjunctivae normal.   Cardiovascular:      Rate and Rhythm: Normal rate and regular rhythm.      Heart sounds: No murmur heard.  Pulmonary:      Effort: Pulmonary effort is normal.      Breath sounds: Normal breath sounds.   Abdominal:      Palpations: Abdomen is soft.      Tenderness: There is no abdominal tenderness.   Musculoskeletal:         General: No swelling. Normal range of motion.      Right lower leg: No " edema.      Left lower leg: No edema.   Lymphadenopathy:      Head:      Right side of head: No tonsillar adenopathy.      Left side of head: No tonsillar adenopathy.      Cervical: No cervical adenopathy.   Skin:     General: Skin is warm.      Findings: No rash.   Neurological:      General: No focal deficit present.      Mental Status: She is alert and oriented to person, place, and time.   Psychiatric:         Mood and Affect: Mood normal.         Behavior: Behavior normal.        Result Review :   The following data was reviewed by: Meredith Lea Kehrer, MD on 04/28/2022:  Most Recent A1C    HGBA1C Most Recent 4/28/22   Hemoglobin A1C 7.5                     Assessment and Plan    Diagnoses and all orders for this visit:    1. Type 2 diabetes mellitus without complication, with long-term current use of insulin ([facility]) (Primary)  -     POC Glycosylated Hemoglobin (Hb A1C)    2. Essential hypertension    3. Hyperlipidemia, unspecified hyperlipidemia type    4. Type 2 diabetes mellitus without complications ([facility])  -     SITagliptin (Januvia) 100 MG tablet; Take 1 tablet by mouth Daily.  Dispense: 90 tablet; Refill: 1    5. Decreased hearing of both ears    1. Type 2 diabetes mellitus without complications, with long-term current use of insulin  · Her last A1c was 7.5. Her glucose has been high at home for the last week.  · She will cut out eating after dinner.  · She understands if she has increased thirst or urination, or her fasting glucose jumps over 200, she will let me know.  · Will recheck A1c in 3 months with a follow up; Future.    2. Essential hypertension  · Stable.  · Her blood pressure today in the office is 126/80 mmHg.    3. Hyperlipidemia  · Controlled with atorvastatin which she will continue.    5. Decreased hearing  · Recommend she see an audiologist.                Follow Up   Return in about 3 months (around 7/28/2022) for Diabetes Check.  Patient was given instructions and counseling regarding  her condition or for health maintenance advice. Please see specific information pulled into the AVS if appropriate.     Transcribed from ambient dictation for Meredith Lea Kehrer, MD by Kathleen Greco.  04/28/22   11:17 EDT    Patient verbalized consent to the visit recording.

## 2022-06-23 DIAGNOSIS — I10 ESSENTIAL (PRIMARY) HYPERTENSION: ICD-10-CM

## 2022-06-23 RX ORDER — IRBESARTAN 300 MG/1
TABLET ORAL
Qty: 90 TABLET | Refills: 0 | Status: SHIPPED | OUTPATIENT
Start: 2022-06-23 | End: 2022-09-27

## 2022-08-08 DIAGNOSIS — E11.9 TYPE 2 DIABETES MELLITUS WITHOUT COMPLICATION, WITHOUT LONG-TERM CURRENT USE OF INSULIN: Chronic | ICD-10-CM

## 2022-08-08 RX ORDER — METFORMIN HYDROCHLORIDE 500 MG/1
TABLET, EXTENDED RELEASE ORAL
Qty: 270 TABLET | Refills: 0 | Status: SHIPPED | OUTPATIENT
Start: 2022-08-08 | End: 2022-11-21

## 2022-09-27 DIAGNOSIS — I10 ESSENTIAL (PRIMARY) HYPERTENSION: ICD-10-CM

## 2022-09-27 RX ORDER — IRBESARTAN 300 MG/1
TABLET ORAL
Qty: 90 TABLET | Refills: 0 | Status: SHIPPED | OUTPATIENT
Start: 2022-09-27 | End: 2022-12-19

## 2022-10-14 ENCOUNTER — OFFICE VISIT (OUTPATIENT)
Dept: FAMILY MEDICINE CLINIC | Facility: CLINIC | Age: 72
End: 2022-10-14

## 2022-10-14 VITALS
DIASTOLIC BLOOD PRESSURE: 72 MMHG | TEMPERATURE: 97.8 F | HEIGHT: 66 IN | WEIGHT: 164.4 LBS | OXYGEN SATURATION: 98 % | HEART RATE: 63 BPM | BODY MASS INDEX: 26.42 KG/M2 | SYSTOLIC BLOOD PRESSURE: 138 MMHG

## 2022-10-14 DIAGNOSIS — E11.9 TYPE 2 DIABETES MELLITUS WITHOUT COMPLICATIONS: ICD-10-CM

## 2022-10-14 DIAGNOSIS — I10 ESSENTIAL HYPERTENSION: ICD-10-CM

## 2022-10-14 DIAGNOSIS — R53.83 OTHER FATIGUE: ICD-10-CM

## 2022-10-14 DIAGNOSIS — Z23 FLU VACCINE NEED: ICD-10-CM

## 2022-10-14 DIAGNOSIS — E78.5 HYPERLIPIDEMIA, UNSPECIFIED HYPERLIPIDEMIA TYPE: ICD-10-CM

## 2022-10-14 DIAGNOSIS — E11.9 TYPE 2 DIABETES MELLITUS WITHOUT COMPLICATION, WITHOUT LONG-TERM CURRENT USE OF INSULIN: Primary | ICD-10-CM

## 2022-10-14 LAB
EXPIRATION DATE: ABNORMAL
HBA1C MFR BLD: 7.2 %
Lab: ABNORMAL

## 2022-10-14 PROCEDURE — 83036 HEMOGLOBIN GLYCOSYLATED A1C: CPT | Performed by: FAMILY MEDICINE

## 2022-10-14 PROCEDURE — 90662 IIV NO PRSV INCREASED AG IM: CPT | Performed by: FAMILY MEDICINE

## 2022-10-14 PROCEDURE — 99214 OFFICE O/P EST MOD 30 MIN: CPT | Performed by: FAMILY MEDICINE

## 2022-10-14 PROCEDURE — G0008 ADMIN INFLUENZA VIRUS VAC: HCPCS | Performed by: FAMILY MEDICINE

## 2022-10-14 RX ORDER — VIT C/B6/B5/MAGNESIUM/HERB 173 50-5-6-5MG
CAPSULE ORAL
COMMUNITY

## 2022-10-14 NOTE — PROGRESS NOTES
"Answers for HPI/ROS submitted by the patient on 10/13/2022  What is the primary reason for your visit?: Diabetes    Chief Complaint  Med Refill, Diabetes, Hypertension, and Hyperlipidemia    Subjective        Valery Maynard presents to [facility]  History of Present Illness    The patient presents today for diabetes and blood pressure check.    Diabetes  The patient states she is anxious to see what her blood glucose is. She believes her sugar level is gradually getting higher. The patient states she is trying to get 10,000 steps a day, but she is more tired and washed out. She states she takes her dog for a mile in the morning and tries to get 10,000 steps a day. The patient states she has been eating out of the garden and has been exercising. She states she needs a refill of her Januvia medication.    Hypertension  The patient's blood pressure is 138/72 mmHg in the office today. She states she is taking her medications as prescribed. The patient denies any chest pain.    Plantar fasciitis  The patient states she has had plantar fasciitis and a heel spur for 3 to 5 months. She states she has been back a couple of times since 04/2022. The patient states she has been wearing [employer] tennis shoes.    Objective   Vital Signs:  /72   Pulse 63   Temp 97.8 °F (36.6 °C)   Ht 167.6 cm (66\")   Wt 74.6 kg (164 lb 6.4 oz)   SpO2 98%   BMI 26.53 kg/m²   Estimated body mass index is 26.53 kg/m² as calculated from the following:    Height as of this encounter: 167.6 cm (66\").    Weight as of this encounter: 74.6 kg (164 lb 6.4 oz).          Physical Exam  Constitutional:       General: She is not in acute distress.     Appearance: Normal appearance. She is well-developed.   HENT:      Head: Normocephalic and atraumatic.      Right Ear: Tympanic membrane, ear canal and external ear normal.      Left Ear: Tympanic membrane, ear canal and external ear normal.      Mouth/Throat:      Mouth: " Mucous membranes are moist.      Pharynx: Oropharynx is clear.   Eyes:      Conjunctiva/sclera: Conjunctivae normal.      Pupils: Pupils are equal, round, and reactive to light.   Neck:      Thyroid: No thyromegaly.   Cardiovascular:      Rate and Rhythm: Normal rate and regular rhythm.      Heart sounds: No murmur heard.  Pulmonary:      Effort: Pulmonary effort is normal.      Breath sounds: Normal breath sounds. No wheezing.   Abdominal:      General: Bowel sounds are normal.      Palpations: Abdomen is soft.      Tenderness: There is no abdominal tenderness.   Musculoskeletal:         General: Normal range of motion.      Cervical back: Neck supple.   Lymphadenopathy:      Cervical: No cervical adenopathy.   Skin:     General: Skin is warm and dry.   Neurological:      Mental Status: She is alert and oriented to person, place, and time.   Psychiatric:         Mood and Affect: Mood normal.         Behavior: Behavior normal.        Result Review :  The following data was reviewed by: Meredith Lea Kehrer, MD on 10/14/2022:  A1C Last 3 Results    HGBA1C Last 3 Results 1/5/22 4/28/22 10/14/22   Hemoglobin A1C 7.1 7.5 7.2                     Assessment and Plan   Diagnoses and all orders for this visit:    1. Type 2 diabetes mellitus without complication, without long-term current use of insulin (HCC) (Primary)  -     POC Glycosylated Hemoglobin (Hb A1C)  -     TSH  -     Lipid Panel  -     CBC & Differential  -     Comprehensive Metabolic Panel    2. Essential hypertension  -     TSH  -     Lipid Panel  -     CBC & Differential  -     Comprehensive Metabolic Panel    3. Hyperlipidemia, unspecified hyperlipidemia type       - We will check labs today    4. Other fatigue  -     Vitamin B12  -     TSH  -     CBC & Differential  -     Comprehensive Metabolic Panel    5. Type 2 diabetes mellitus without complications (HCC)  -     SITagliptin (Januvia) 100 MG tablet; Take 1 tablet by mouth Daily.  Dispense: 90 tablet;  Refill: 1    6. Flu vaccine need  -     Fluzone High-Dose 65+yrs ([id number])             Follow Up   Return in about 6 months (around 4/14/2023) for Diabetes Check, Medicare Wellness.  Patient was given instructions and counseling regarding her condition or for health maintenance advice. Please see specific information pulled into the AVS if appropriate.     Transcribed from ambient dictation for Meredith Lea Kehrer, MD by Lexi Lundberg.  10/14/22   10:21 EDT    Patient or patient representative verbalized consent to the visit recording.  I have personally performed the services described in this document as transcribed by the above individual, and it is both accurate and complete.

## 2022-10-15 LAB
ALBUMIN SERPL-MCNC: 4.3 G/DL (ref 3.5–5.2)
ALBUMIN/GLOB SERPL: 2.5 G/DL
ALP SERPL-CCNC: 112 U/L (ref 39–117)
ALT SERPL-CCNC: 15 U/L (ref 1–33)
AST SERPL-CCNC: 16 U/L (ref 1–32)
BASOPHILS # BLD AUTO: 0.03 10*3/MM3 (ref 0–0.2)
BASOPHILS NFR BLD AUTO: 0.6 % (ref 0–1.5)
BILIRUB SERPL-MCNC: 0.4 MG/DL (ref 0–1.2)
BUN SERPL-MCNC: 18 MG/DL (ref 8–23)
BUN/CREAT SERPL: 26.5 (ref 7–25)
CALCIUM SERPL-MCNC: 8.9 MG/DL (ref 8.6–10.5)
CHLORIDE SERPL-SCNC: 106 MMOL/L (ref 98–107)
CHOLEST SERPL-MCNC: 161 MG/DL (ref 0–200)
CO2 SERPL-SCNC: 25.4 MMOL/L (ref 22–29)
CREAT SERPL-MCNC: 0.68 MG/DL (ref 0.57–1)
EGFRCR SERPLBLD CKD-EPI 2021: 92.1 ML/MIN/1.73
EOSINOPHIL # BLD AUTO: 0.26 10*3/MM3 (ref 0–0.4)
EOSINOPHIL NFR BLD AUTO: 4.9 % (ref 0.3–6.2)
ERYTHROCYTE [DISTWIDTH] IN BLOOD BY AUTOMATED COUNT: 11.8 % (ref 12.3–15.4)
GLOBULIN SER CALC-MCNC: 1.7 GM/DL
GLUCOSE SERPL-MCNC: 143 MG/DL (ref 65–99)
HCT VFR BLD AUTO: 39.9 % (ref 34–46.6)
HDLC SERPL-MCNC: 55 MG/DL (ref 40–60)
HGB BLD-MCNC: 13.4 G/DL (ref 12–15.9)
IMM GRANULOCYTES # BLD AUTO: 0.01 10*3/MM3 (ref 0–0.05)
IMM GRANULOCYTES NFR BLD AUTO: 0.2 % (ref 0–0.5)
LDLC SERPL CALC-MCNC: 84 MG/DL (ref 0–100)
LYMPHOCYTES # BLD AUTO: 1.61 10*3/MM3 (ref 0.7–3.1)
LYMPHOCYTES NFR BLD AUTO: 30.5 % (ref 19.6–45.3)
MCH RBC QN AUTO: 30.5 PG (ref 26.6–33)
MCHC RBC AUTO-ENTMCNC: 33.6 G/DL (ref 31.5–35.7)
MCV RBC AUTO: 90.7 FL (ref 79–97)
MONOCYTES # BLD AUTO: 0.54 10*3/MM3 (ref 0.1–0.9)
MONOCYTES NFR BLD AUTO: 10.2 % (ref 5–12)
NEUTROPHILS # BLD AUTO: 2.83 10*3/MM3 (ref 1.7–7)
NEUTROPHILS NFR BLD AUTO: 53.6 % (ref 42.7–76)
NRBC BLD AUTO-RTO: 0 /100 WBC (ref 0–0.2)
PLATELET # BLD AUTO: 143 10*3/MM3 (ref 140–450)
POTASSIUM SERPL-SCNC: 4.3 MMOL/L (ref 3.5–5.2)
PROT SERPL-MCNC: 6 G/DL (ref 6–8.5)
RBC # BLD AUTO: 4.4 10*6/MM3 (ref 3.77–5.28)
SODIUM SERPL-SCNC: 141 MMOL/L (ref 136–145)
TRIGL SERPL-MCNC: 125 MG/DL (ref 0–150)
TSH SERPL DL<=0.005 MIU/L-ACNC: 1.33 UIU/ML (ref 0.27–4.2)
VIT B12 SERPL-MCNC: 1051 PG/ML (ref 211–946)
VLDLC SERPL CALC-MCNC: 22 MG/DL (ref 5–40)
WBC # BLD AUTO: 5.28 10*3/MM3 (ref 3.4–10.8)

## 2022-11-20 DIAGNOSIS — E11.9 TYPE 2 DIABETES MELLITUS WITHOUT COMPLICATION, WITHOUT LONG-TERM CURRENT USE OF INSULIN: Chronic | ICD-10-CM

## 2022-11-21 RX ORDER — METFORMIN HYDROCHLORIDE 500 MG/1
TABLET, EXTENDED RELEASE ORAL
Qty: 270 TABLET | Refills: 0 | Status: SHIPPED | OUTPATIENT
Start: 2022-11-21 | End: 2023-02-18 | Stop reason: SDUPTHER

## 2022-12-17 DIAGNOSIS — I10 ESSENTIAL (PRIMARY) HYPERTENSION: ICD-10-CM

## 2022-12-19 RX ORDER — IRBESARTAN 300 MG/1
TABLET ORAL
Qty: 90 TABLET | Refills: 0 | Status: SHIPPED | OUTPATIENT
Start: 2022-12-19 | End: 2023-03-15 | Stop reason: SDUPTHER

## 2023-01-30 DIAGNOSIS — E11.9 TYPE 2 DIABETES MELLITUS WITHOUT COMPLICATIONS: ICD-10-CM

## 2023-01-31 RX ORDER — ATORVASTATIN CALCIUM 20 MG/1
20 TABLET, FILM COATED ORAL DAILY
Qty: 90 TABLET | Refills: 0 | Status: SHIPPED | OUTPATIENT
Start: 2023-01-31

## 2023-02-18 DIAGNOSIS — E11.9 TYPE 2 DIABETES MELLITUS WITHOUT COMPLICATION, WITHOUT LONG-TERM CURRENT USE OF INSULIN: Chronic | ICD-10-CM

## 2023-02-20 RX ORDER — METFORMIN HYDROCHLORIDE 500 MG/1
500 TABLET, EXTENDED RELEASE ORAL
Qty: 270 TABLET | Refills: 0 | Status: SHIPPED | OUTPATIENT
Start: 2023-02-20

## 2023-03-15 DIAGNOSIS — I10 ESSENTIAL (PRIMARY) HYPERTENSION: ICD-10-CM

## 2023-03-16 RX ORDER — IRBESARTAN 300 MG/1
300 TABLET ORAL DAILY
Qty: 90 TABLET | Refills: 0 | Status: SHIPPED | OUTPATIENT
Start: 2023-03-16

## 2023-04-14 ENCOUNTER — TELEPHONE (OUTPATIENT)
Dept: FAMILY MEDICINE CLINIC | Facility: CLINIC | Age: 73
End: 2023-04-14
Payer: MEDICARE

## 2023-04-14 DIAGNOSIS — E11.9 TYPE 2 DIABETES MELLITUS WITHOUT COMPLICATION, WITHOUT LONG-TERM CURRENT USE OF INSULIN: Chronic | ICD-10-CM

## 2023-04-14 RX ORDER — METFORMIN HYDROCHLORIDE 500 MG/1
1500 TABLET, EXTENDED RELEASE ORAL
Qty: 270 TABLET | Refills: 1 | Status: SHIPPED | OUTPATIENT
Start: 2023-04-14

## 2023-04-14 RX ORDER — METFORMIN HYDROCHLORIDE 500 MG/1
500 TABLET, EXTENDED RELEASE ORAL
Qty: 270 TABLET | Refills: 0 | Status: SHIPPED | OUTPATIENT
Start: 2023-04-14 | End: 2023-04-14 | Stop reason: SDUPTHER

## 2023-04-14 NOTE — TELEPHONE ENCOUNTER
Caller: Caesar Valery MUKUND    Relationship: Self    Best call back number: [phone]    Requested Prescriptions:   Requested Prescriptions     Pending Prescriptions Disp Refills   • metFORMIN ER (GLUCOPHAGE-XR) 500 MG 24 hr tablet 270 tablet 0     Sig: Take 1 tablet by mouth Daily With Breakfast.        Pharmacy where request should be sent: [facility] [id number] [address] AT  [address] [facility] [address] - [phone]  - [phone]     Last office visit with prescribing clinician: 10/14/2022   Last telemedicine visit with prescribing clinician: 4/19/2023   Next office visit with prescribing clinician: 4/19/2023     Additional details provided by patient: PATIENT STATED SHE IS OUT OF THIS MEDICATION.    PATIENT STATED AFTER SWITCHING PHARMACIES, THE INSTRUCTIONS FOR THIS PRESCRIPTION WERE CHANGED FROM 3 TABLETS DAILY, TO ONE TABLET DAILY, HOWEVER, THE PATIENT STILL TAKES 3 TABLETS DAILY.    PATIENT STATED AS A RESULT OF THIS CHANGE, SHE HAS RUN OUR OF HER MEDICATION,M AND HER INSURANCE WILL NOT LET HER REFILL AS IT APPEARS TO BE TOO SOON FOR A REFILL OF THIS MEDICATION.    PLEASE AMEND THE PRESCRIPTION AND SEND A REFILL TO THE PATIENTS PHARMACY.    Does the patient have less than a 3 day supply:  [x] Yes  [] No    Oziel Hoffman Rep   04/14/23 11:47 EDT

## 2023-04-14 NOTE — TELEPHONE ENCOUNTER
Pharmacy Name: [facility] [id number] - [address] AT  [address] - [phone]  - [phone]      Pharmacy representative name: JUAN CARLOS    Pharmacy representative phone number: [phone]    What medication are you calling in regards to: metFORMIN ER (GLUCOPHAGE-XR) 500 MG 24 hr tablet    What question does the pharmacy have: THE PHARMACY RECEIVED THIS PRESCRIPTION BACK IN February AND THE DIRECTIONS WERE TO TAKE ONE TABLET DAILY WITH BREAKFAST AND IT WAS A QUANTITY . PATIENT HAS TOLD THEM SHE TAKES IT THREE TIMES DAILY. THE PHARMACY HAS RECEIVED THAT SAME PRESCRIPTION AGAIN, AND THE PRESCRIPTION THEY HAVE IS INCORRECT IF SHE IS IN FACT TAKING IT THREE TIMES A DAY. PATIENT IS OUT OF MEDICATION. PLEASE ADVISE.    Who is the provider that prescribed the medication: DR KEHRER

## 2023-04-19 ENCOUNTER — OFFICE VISIT (OUTPATIENT)
Dept: FAMILY MEDICINE CLINIC | Facility: CLINIC | Age: 73
End: 2023-04-19
Payer: MEDICARE

## 2023-04-19 VITALS
OXYGEN SATURATION: 97 % | WEIGHT: 161.2 LBS | HEIGHT: 66 IN | HEART RATE: 62 BPM | SYSTOLIC BLOOD PRESSURE: 130 MMHG | TEMPERATURE: 98.4 F | BODY MASS INDEX: 25.91 KG/M2 | DIASTOLIC BLOOD PRESSURE: 74 MMHG

## 2023-04-19 DIAGNOSIS — R25.2 LEG CRAMPS: ICD-10-CM

## 2023-04-19 DIAGNOSIS — Z12.31 ENCOUNTER FOR SCREENING MAMMOGRAM FOR MALIGNANT NEOPLASM OF BREAST: ICD-10-CM

## 2023-04-19 DIAGNOSIS — Z00.00 MEDICARE ANNUAL WELLNESS VISIT, SUBSEQUENT: Primary | ICD-10-CM

## 2023-04-19 DIAGNOSIS — Z86.010 HISTORY OF COLON POLYPS: ICD-10-CM

## 2023-04-19 DIAGNOSIS — E78.5 HYPERLIPIDEMIA, UNSPECIFIED HYPERLIPIDEMIA TYPE: ICD-10-CM

## 2023-04-19 DIAGNOSIS — I10 ESSENTIAL HYPERTENSION: ICD-10-CM

## 2023-04-19 DIAGNOSIS — Z78.0 MENOPAUSE: ICD-10-CM

## 2023-04-19 DIAGNOSIS — E11.9 TYPE 2 DIABETES MELLITUS WITHOUT COMPLICATION, WITHOUT LONG-TERM CURRENT USE OF INSULIN: ICD-10-CM

## 2023-04-19 LAB
EXPIRATION DATE: ABNORMAL
HBA1C MFR BLD: 7.5 %
Lab: ABNORMAL
POC CREATININE URINE: 200
POC MICROALBUMIN URINE: 80

## 2023-04-19 RX ORDER — PIOGLITAZONEHYDROCHLORIDE 15 MG/1
15 TABLET ORAL DAILY
Qty: 90 TABLET | Refills: 1 | Status: SHIPPED | OUTPATIENT
Start: 2023-04-19

## 2023-04-19 NOTE — PROGRESS NOTES
The ABCs of the Annual Wellness Visit  Subsequent Medicare Wellness Visit    Subjective    Valery Maynard is a 72 y.o. female who presents for a Subsequent Medicare Wellness Visit.    The following portions of the patient's history were reviewed and   updated as appropriate: allergies, current medications, past family history, past medical history, past social history, past surgical history and problem list.    Compared to one year ago, the patient feels her physical   health is the same.    Compared to one year ago, the patient feels her mental   health is better.    Recent Hospitalizations:  She was not admitted to the hospital during the last year.       Current Medical Providers:  Patient Care Team:  Kehrer, Meredith Lea, MD as PCP - General (Family Medicine)    Outpatient Medications Prior to Visit   Medication Sig Dispense Refill   • atorvastatin (LIPITOR) 20 MG tablet Take 1 tablet by mouth Daily. 90 tablet 0   • Blood Glucose Monitoring Suppl (FreeStyle Lite) device 1 each Daily. 1 each 0   • cetirizine (zyrTEC) 10 MG tablet Take 1 tablet by mouth Daily.     • clobetasol (TEMOVATE) 0.05 % external solution Apply  topically to the appropriate area as directed 2 (Two) Times a Day As Needed (itch). 50 mL 0   • glucose blood (FREESTYLE LITE) test strip Use as instructed 100 each 3   • irbesartan (AVAPRO) 300 MG tablet Take 1 tablet by mouth Daily. 90 tablet 0   • ketoconazole (NIZORAL) 2 % shampoo Apply 1 application topically to the appropriate area as directed 2 (Two) Times a Week.     • metFORMIN ER (GLUCOPHAGE-XR) 500 MG 24 hr tablet Take 3 tablets by mouth Daily With Breakfast. 270 tablet 1   • SITagliptin (Januvia) 100 MG tablet Take 1 tablet by mouth Daily. 90 tablet 1   • vitamin B-12 (CYANOCOBALAMIN) 1000 MCG tablet Take 1 tablet by mouth Every Other Day.     • Turmeric 500 MG capsule Take  by mouth. (Patient not taking: Reported on 4/19/2023)       No facility-administered medications prior to visit.  "      No opioid medication identified on active medication list. I have reviewed chart for other potential  high risk medication/s and harmful drug interactions in the elderly.          Aspirin is not on active medication list.  Aspirin use is not indicated based on review of current medical condition/s. Risk of harm outweighs potential benefits.  .    Patient Active Problem List   Diagnosis   • Positive colorectal cancer screening using DNA-based stool test   • Renal calculus, left   • Essential hypertension   • Hyperlipidemia   • Type 2 diabetes mellitus without complication, without long-term current use of insulin   • Seborrheic dermatitis of scalp   • Other fatigue     Advance Care Planning   Advance Care Planning     Advance Directive is not on file.  ACP discussion was held with the patient during this visit. Patient has an advance directive (not in EMR), copy requested.     Objective    Vitals:    04/19/23 0934   BP: 130/74   Pulse: 62   Temp: 98.4 °F (36.9 °C)   SpO2: 97%   Weight: 73.1 kg (161 lb 3.2 oz)   Height: 167.6 cm (66\")     Estimated body mass index is 26.02 kg/m² as calculated from the following:    Height as of this encounter: 167.6 cm (66\").    Weight as of this encounter: 73.1 kg (161 lb 3.2 oz).    BMI is >= 25 and <30. (Overweight) The following options were offered after discussion;: none (medical contraindication)      Does the patient have evidence of cognitive impairment? No    Lab Results   Component Value Date    HGBA1C 7.5 04/19/2023        HEALTH RISK ASSESSMENT    Smoking Status:  Social History     Tobacco Use   Smoking Status Former   Smokeless Tobacco Never   Tobacco Comments    QUIT 25 YRS AGO-was 1 ppd for 10-15 years     Alcohol Consumption:  Social History     Substance and Sexual Activity   Alcohol Use Yes    Comment: RARE     Fall Risk Screen:    STEADI Fall Risk Assessment was completed, and patient is at LOW risk for falls.Assessment completed on:4/19/2023    Depression " Screenin/19/2023     9:32 AM   PHQ-2/PHQ-9 Depression Screening   Little Interest or Pleasure in Doing Things 0-->not at all   Feeling Down, Depressed or Hopeless 0-->not at all   PHQ-9: Brief Depression Severity Measure Score 0       Health Habits and Functional and Cognitive Screenin/19/2023     9:32 AM   Functional & Cognitive Status   Do you have difficulty preparing food and eating? No   Do you have difficulty bathing yourself, getting dressed or grooming yourself? No   Do you have difficulty using the toilet? No   Do you have difficulty moving around from place to place? No   Do you have trouble with steps or getting out of a bed or a chair? No   Current Diet Well Balanced Diet   Dental Exam Up to date   Eye Exam Up to date   Exercise (times per week) 2 times per week   Current Exercises Include Walking;House Cleaning;Gardening   Do you need help using the phone?  No   Are you deaf or do you have serious difficulty hearing?  No   Do you need help with transportation? No   Do you need help shopping? No   Do you need help preparing meals?  No   Do you need help with housework?  No   Do you need help with laundry? No   Do you need help taking your medications? No   Do you need help managing money? No   Do you ever drive or ride in a car without wearing a seat belt? No       Age-appropriate Screening Schedule:  Refer to the list below for future screening recommendations based on patient's age, sex and/or medical conditions. Orders for these recommended tests are listed in the plan section. The patient has been provided with a written plan.    Health Maintenance   Topic Date Due   • TDAP/TD VACCINES (1 - Tdap) Never done   • ZOSTER VACCINE (1 of 2) Never done   • DXA SCAN  10/05/2019   • COVID-19 Vaccine (3 - Booster for Moderna series) 2021   • DIABETIC FOOT EXAM  2021   • URINE MICROALBUMIN  2022   • COLORECTAL CANCER SCREENING  2022   • MAMMOGRAM  2022   • ANNUAL  "WELLNESS VISIT  01/05/2023   • INFLUENZA VACCINE  08/01/2023   • DIABETIC EYE EXAM  09/14/2023   • LIPID PANEL  10/14/2023   • HEMOGLOBIN A1C  10/19/2023   • HEPATITIS C SCREENING  Completed   • Pneumococcal Vaccine 65+  Completed                  CMS Preventative Services Quick Reference  Risk Factors Identified During Encounter  None Identified  The above risks/problems have been discussed with the patient.  Pertinent information has been shared with the patient in the After Visit Summary.  An After Visit Summary and PPPS were made available to the patient.    Follow Up:   Next Medicare Wellness visit to be scheduled in 1 year.       Additional E&M Note during same encounter follows:  Patient has multiple medical problems which are significant and separately identifiable that require additional work above and beyond the Medicare Wellness Visit.      Chief Complaint  Medicare Wellness-subsequent, Diabetes, Hyperlipidemia, and Hypertension    Subjective        HPI  Valery Maynard is also being seen today for follow-up on hypertension, diabetes.  She is compliant with her medications.  She has had some leg cramps recently that only, at night.         Objective   Vital Signs:  /74   Pulse 62   Temp 98.4 °F (36.9 °C)   Ht 167.6 cm (66\")   Wt 73.1 kg (161 lb 3.2 oz)   SpO2 97%   BMI 26.02 kg/m²     Physical Exam  Vitals and nursing note reviewed.   Constitutional:       General: She is not in acute distress.     Appearance: Normal appearance. She is well-developed.   HENT:      Head: Normocephalic and atraumatic.      Right Ear: Tympanic membrane, ear canal and external ear normal.      Left Ear: Tympanic membrane, ear canal and external ear normal.      Nose: Nose normal.      Mouth/Throat:      Mouth: Mucous membranes are moist.      Pharynx: Oropharynx is clear. No oropharyngeal exudate or posterior oropharyngeal erythema.   Eyes:      Conjunctiva/sclera: Conjunctivae normal.      Pupils: Pupils are equal, " round, and reactive to light.   Neck:      Thyroid: No thyromegaly.   Cardiovascular:      Rate and Rhythm: Normal rate and regular rhythm.      Heart sounds: No murmur heard.  Pulmonary:      Effort: Pulmonary effort is normal.      Breath sounds: Normal breath sounds. No wheezing.   Abdominal:      General: Abdomen is flat. Bowel sounds are normal. There is no distension.      Palpations: Abdomen is soft. There is no mass.      Tenderness: There is no abdominal tenderness.      Hernia: No hernia is present.   Musculoskeletal:         General: No swelling. Normal range of motion.      Cervical back: Normal range of motion and neck supple.      Right lower leg: No edema.      Left lower leg: No edema.   Lymphadenopathy:      Cervical: No cervical adenopathy.   Skin:     General: Skin is warm and dry.      Capillary Refill: Capillary refill takes less than 2 seconds.      Findings: No rash.   Neurological:      General: No focal deficit present.      Mental Status: She is alert and oriented to person, place, and time.      Cranial Nerves: No cranial nerve deficit.   Psychiatric:         Mood and Affect: Mood normal.         Behavior: Behavior normal.                         Assessment and Plan   Diagnoses and all orders for this visit:    1. Medicare annual wellness visit, subsequent (Primary)    2. Type 2 diabetes mellitus without complication, without long-term current use of insulin  -     POC Glycosylated Hemoglobin (Hb A1C)  -     POC Microalbumin  -     pioglitazone (Actos) 15 MG tablet; Take 1 tablet by mouth Daily.  Dispense: 90 tablet; Refill: 1  -     CBC & Differential  -     Comprehensive Metabolic Panel  -     Lipid Panel  -     TSH    3. Essential hypertension  -     CBC & Differential  -     Comprehensive Metabolic Panel  -     Lipid Panel  -     TSH    4. Hyperlipidemia, unspecified hyperlipidemia type  -     Comprehensive Metabolic Panel  -     Lipid Panel    5. Encounter for screening mammogram for  malignant neoplasm of breast  -     Mammo Screening Digital Tomosynthesis Bilateral With CAD; Future    6. History of colon polyps  -     Ambulatory Referral For Screening Colonoscopy    7. Menopause  -     DEXA Bone Density Axial; Future    8. Leg cramps  -     C-reactive Protein  -     CK  -     Magnesium    Diabetes- increasing A1c, will add in Actos to see if helps  Hypertension-controlled  Hyperlipidemia-due for labs  Leg cramps-we will check additional labs today, patient encouraged to increase her fluid headache  Routine health maintenance-due for mammogram density scan and colonoscopy         Follow Up   Return in about 3 months (around 7/19/2023) for Diabetes Check.  Patient was given instructions and counseling regarding her condition or for health maintenance advice. Please see specific information pulled into the AVS if appropriate.

## 2023-04-19 NOTE — PATIENT INSTRUCTIONS
Medicare Wellness  Personal Prevention Plan of Service     Date of Office Visit:    Encounter Provider:  Meredith Lea Kehrer, MD  Place of Service:  [facility]  Patient Name: Valery Maynard  :  1950    As part of the Medicare Wellness portion of your visit today, we are providing you with this personalized preventive plan of services (PPPS). This plan is based upon recommendations of the [place] Preventive Services Task Force (USPSTF) and the Advisory Committee on Immunization Practices (ACIP).    This lists the preventive care services that should be considered, and provides dates of when you are due. Items listed as completed are up-to-date and do not require any further intervention.    Health Maintenance   Topic Date Due    TDAP/TD VACCINES (1 - Tdap) Never done    ZOSTER VACCINE (1 of 2) Never done    DXA SCAN  10/05/2019    COVID-19 Vaccine (3 - Booster for Moderna series) 2021    DIABETIC FOOT EXAM  2021    URINE MICROALBUMIN  2022    COLORECTAL CANCER SCREENING  2022    MAMMOGRAM  2022    ANNUAL WELLNESS VISIT  2023    INFLUENZA VACCINE  2023    DIABETIC EYE EXAM  2023    LIPID PANEL  10/14/2023    HEMOGLOBIN A1C  10/19/2023    HEPATITIS C SCREENING  Completed    Pneumococcal Vaccine 65+  Completed       Orders Placed This Encounter   Procedures    Mammo Screening Digital Tomosynthesis Bilateral With CAD     Standing Status:   Future     Standing Expiration Date:   2024     Order Specific Question:   Reason for Exam:     Answer:   screening     Order Specific Question:   Does this patient have a diabetic monitoring/medication delivering device on?     Answer:   No     Order Specific Question:   Release to patient     Answer:   Routine Release    DEXA Bone Density Axial     Standing Status:   Future     Standing Expiration Date:   2024     Order Specific Question:   Reason for Exam:     Answer:   screening     Comprehensive Metabolic Panel     Order Specific Question:   Release to patient     Answer:   Routine Release    C-reactive Protein     Order Specific Question:   Release to patient     Answer:   Routine Release    Lipid Panel    TSH     Order Specific Question:   Release to patient     Answer:   Routine Release    CK     Order Specific Question:   Release to patient     Answer:   Routine Release    Magnesium     Order Specific Question:   Release to patient     Answer:   Routine Release    Ambulatory Referral For Screening Colonoscopy     Referral Priority:   Routine     Referral Type:   Diagnostic Medical     Referral Reason:   Specialty Services Required     Referred to Provider:   Dimitri Olivera MD     Number of Visits Requested:   1    POC Glycosylated Hemoglobin (Hb A1C)     Order Specific Question:   Release to patient     Answer:   Routine Release    POC Microalbumin    CBC & Differential     Order Specific Question:   Manual Differential     Answer:   No       Return in about 3 months (around 7/19/2023) for Diabetes Check.

## 2023-04-20 LAB
ALBUMIN SERPL-MCNC: 4.5 G/DL (ref 3.5–5.2)
ALBUMIN/GLOB SERPL: 2 G/DL
ALP SERPL-CCNC: 127 U/L (ref 39–117)
ALT SERPL-CCNC: 13 U/L (ref 1–33)
AST SERPL-CCNC: 16 U/L (ref 1–32)
BASOPHILS # BLD AUTO: 0.03 10*3/MM3 (ref 0–0.2)
BASOPHILS NFR BLD AUTO: 0.6 % (ref 0–1.5)
BILIRUB SERPL-MCNC: 0.6 MG/DL (ref 0–1.2)
BUN SERPL-MCNC: 22 MG/DL (ref 8–23)
BUN/CREAT SERPL: 30.6 (ref 7–25)
CALCIUM SERPL-MCNC: 9.7 MG/DL (ref 8.6–10.5)
CHLORIDE SERPL-SCNC: 106 MMOL/L (ref 98–107)
CHOLEST SERPL-MCNC: 154 MG/DL (ref 0–200)
CK SERPL-CCNC: 66 U/L (ref 20–180)
CO2 SERPL-SCNC: 30.4 MMOL/L (ref 22–29)
CREAT SERPL-MCNC: 0.72 MG/DL (ref 0.57–1)
CRP SERPL-MCNC: <0.3 MG/DL (ref 0–0.5)
EGFRCR SERPLBLD CKD-EPI 2021: 88.4 ML/MIN/1.73
EOSINOPHIL # BLD AUTO: 0.26 10*3/MM3 (ref 0–0.4)
EOSINOPHIL NFR BLD AUTO: 5.3 % (ref 0.3–6.2)
ERYTHROCYTE [DISTWIDTH] IN BLOOD BY AUTOMATED COUNT: 11.9 % (ref 12.3–15.4)
GLOBULIN SER CALC-MCNC: 2.3 GM/DL
GLUCOSE SERPL-MCNC: 150 MG/DL (ref 65–99)
HCT VFR BLD AUTO: 41.4 % (ref 34–46.6)
HDLC SERPL-MCNC: 53 MG/DL (ref 40–60)
HGB BLD-MCNC: 13.6 G/DL (ref 12–15.9)
IMM GRANULOCYTES # BLD AUTO: 0.01 10*3/MM3 (ref 0–0.05)
IMM GRANULOCYTES NFR BLD AUTO: 0.2 % (ref 0–0.5)
LDLC SERPL CALC-MCNC: 86 MG/DL (ref 0–100)
LYMPHOCYTES # BLD AUTO: 1.44 10*3/MM3 (ref 0.7–3.1)
LYMPHOCYTES NFR BLD AUTO: 29.3 % (ref 19.6–45.3)
MAGNESIUM SERPL-MCNC: 1.8 MG/DL (ref 1.6–2.4)
MCH RBC QN AUTO: 30 PG (ref 26.6–33)
MCHC RBC AUTO-ENTMCNC: 32.9 G/DL (ref 31.5–35.7)
MCV RBC AUTO: 91.4 FL (ref 79–97)
MONOCYTES # BLD AUTO: 0.44 10*3/MM3 (ref 0.1–0.9)
MONOCYTES NFR BLD AUTO: 9 % (ref 5–12)
NEUTROPHILS # BLD AUTO: 2.73 10*3/MM3 (ref 1.7–7)
NEUTROPHILS NFR BLD AUTO: 55.6 % (ref 42.7–76)
NRBC BLD AUTO-RTO: 0 /100 WBC (ref 0–0.2)
PLATELET # BLD AUTO: 158 10*3/MM3 (ref 140–450)
POTASSIUM SERPL-SCNC: 4.1 MMOL/L (ref 3.5–5.2)
PROT SERPL-MCNC: 6.8 G/DL (ref 6–8.5)
RBC # BLD AUTO: 4.53 10*6/MM3 (ref 3.77–5.28)
SODIUM SERPL-SCNC: 146 MMOL/L (ref 136–145)
TRIGL SERPL-MCNC: 80 MG/DL (ref 0–150)
TSH SERPL DL<=0.005 MIU/L-ACNC: 1.45 UIU/ML (ref 0.27–4.2)
VLDLC SERPL CALC-MCNC: 15 MG/DL (ref 5–40)
WBC # BLD AUTO: 4.91 10*3/MM3 (ref 3.4–10.8)

## 2023-05-02 DIAGNOSIS — Z78.0 MENOPAUSE: ICD-10-CM

## 2023-05-02 DIAGNOSIS — Z12.31 ENCOUNTER FOR SCREENING MAMMOGRAM FOR MALIGNANT NEOPLASM OF BREAST: ICD-10-CM

## 2023-06-12 DIAGNOSIS — I10 ESSENTIAL (PRIMARY) HYPERTENSION: ICD-10-CM

## 2023-06-12 RX ORDER — IRBESARTAN 300 MG/1
TABLET ORAL
Qty: 90 TABLET | Refills: 0 | Status: SHIPPED | OUTPATIENT
Start: 2023-06-12 | End: 2023-06-14

## 2023-06-14 DIAGNOSIS — I10 ESSENTIAL (PRIMARY) HYPERTENSION: ICD-10-CM

## 2023-06-14 RX ORDER — IRBESARTAN 300 MG/1
TABLET ORAL
Qty: 90 TABLET | Refills: 0 | Status: SHIPPED | OUTPATIENT
Start: 2023-06-14

## 2023-06-29 ENCOUNTER — TELEPHONE (OUTPATIENT)
Dept: FAMILY MEDICINE CLINIC | Facility: CLINIC | Age: 73
End: 2023-06-29

## 2023-06-29 NOTE — TELEPHONE ENCOUNTER
Caller: Valery Maynard    Relationship: Self    Best call back number: [phone]    What is the best time to reach you: ANYTIME    Who are you requesting to speak with (clinical staff, provider,  specific staff member): OFFICE STAFF    What was the call regarding: PATIENT STATES THAT SHE GOT A CALL FROM [facility] AND WAS TOLD DR KEHRER HAD CALLED IN A PAIN MEDICATION BUT IT WAS NOT ABLE TO GO THROUGH INSURANCE. PATIENT IS JUST WANTING TO VERIFY IF DR KEHRER CALLED SOMETHING IN FOR HER AS SHE WANTED TO DOUBLE CHECK.

## 2023-09-11 DIAGNOSIS — I10 ESSENTIAL (PRIMARY) HYPERTENSION: ICD-10-CM

## 2023-09-12 RX ORDER — IRBESARTAN 300 MG/1
300 TABLET ORAL DAILY
Qty: 90 TABLET | Refills: 0 | Status: SHIPPED | OUTPATIENT
Start: 2023-09-12

## 2023-09-23 DIAGNOSIS — E11.9 TYPE 2 DIABETES MELLITUS WITHOUT COMPLICATION, WITHOUT LONG-TERM CURRENT USE OF INSULIN: ICD-10-CM

## 2023-09-25 DIAGNOSIS — E11.9 TYPE 2 DIABETES MELLITUS WITHOUT COMPLICATIONS: ICD-10-CM

## 2023-09-25 RX ORDER — ATORVASTATIN CALCIUM 20 MG/1
20 TABLET, FILM COATED ORAL DAILY
Qty: 90 TABLET | Refills: 0 | Status: SHIPPED | OUTPATIENT
Start: 2023-09-25

## 2023-09-25 RX ORDER — PIOGLITAZONEHYDROCHLORIDE 15 MG/1
15 TABLET ORAL DAILY
Qty: 90 TABLET | Refills: 1 | Status: SHIPPED | OUTPATIENT
Start: 2023-09-25

## 2023-10-06 DIAGNOSIS — E11.9 TYPE 2 DIABETES MELLITUS WITHOUT COMPLICATIONS: ICD-10-CM

## 2023-10-06 DIAGNOSIS — E11.9 TYPE 2 DIABETES MELLITUS WITHOUT COMPLICATION, WITHOUT LONG-TERM CURRENT USE OF INSULIN: Chronic | ICD-10-CM

## 2023-10-06 RX ORDER — METFORMIN HYDROCHLORIDE 500 MG/1
1500 TABLET, EXTENDED RELEASE ORAL
Qty: 270 TABLET | Refills: 0 | Status: SHIPPED | OUTPATIENT
Start: 2023-10-06 | End: 2023-10-09

## 2023-10-08 DIAGNOSIS — E11.9 TYPE 2 DIABETES MELLITUS WITHOUT COMPLICATION, WITHOUT LONG-TERM CURRENT USE OF INSULIN: Chronic | ICD-10-CM

## 2023-10-09 RX ORDER — METFORMIN HYDROCHLORIDE 500 MG/1
TABLET, EXTENDED RELEASE ORAL
Qty: 90 TABLET | Refills: 0 | Status: SHIPPED | OUTPATIENT
Start: 2023-10-09

## 2023-10-23 ENCOUNTER — OFFICE VISIT (OUTPATIENT)
Dept: FAMILY MEDICINE CLINIC | Facility: CLINIC | Age: 73
End: 2023-10-23
Payer: MEDICARE

## 2023-10-23 VITALS
HEART RATE: 73 BPM | SYSTOLIC BLOOD PRESSURE: 132 MMHG | DIASTOLIC BLOOD PRESSURE: 64 MMHG | TEMPERATURE: 97.7 F | HEIGHT: 66 IN | BODY MASS INDEX: 27.13 KG/M2 | WEIGHT: 168.8 LBS | OXYGEN SATURATION: 94 %

## 2023-10-23 DIAGNOSIS — Z23 FLU VACCINE NEED: ICD-10-CM

## 2023-10-23 DIAGNOSIS — I10 ESSENTIAL HYPERTENSION: ICD-10-CM

## 2023-10-23 DIAGNOSIS — Z23 NEED FOR TDAP VACCINATION: ICD-10-CM

## 2023-10-23 DIAGNOSIS — E78.5 HYPERLIPIDEMIA, UNSPECIFIED HYPERLIPIDEMIA TYPE: ICD-10-CM

## 2023-10-23 DIAGNOSIS — E11.9 TYPE 2 DIABETES MELLITUS WITHOUT COMPLICATION, WITHOUT LONG-TERM CURRENT USE OF INSULIN: Primary | ICD-10-CM

## 2023-10-23 LAB
EXPIRATION DATE: ABNORMAL
HBA1C MFR BLD: 7.3 % (ref 4.5–5.7)
Lab: ABNORMAL

## 2023-10-23 RX ORDER — PIOGLITAZONEHYDROCHLORIDE 30 MG/1
30 TABLET ORAL DAILY
Qty: 90 TABLET | Refills: 3 | Status: SHIPPED | OUTPATIENT
Start: 2023-10-23

## 2023-10-23 NOTE — PROGRESS NOTES
Chief Complaint  Diabetes (3 month follow up regarding diabetes)    Subjective        Valery Maynard presents to [facility]  History of Present Illness  Patient presents for 3-month diabetes checkup.  Doing well and compliant with her medication.  She has no acute complaints today.  She has not filled out her paperwork for her colonoscopy yet because she had some questions about it.    Diabetes  She has type 2 diabetes mellitus. No MedicAlert identification noted. The initial diagnosis of diabetes was made 10 years ago. Pertinent negatives for hypoglycemia include no confusion, dizziness, headaches, hunger, mood changes, nervousness/anxiousness, pallor, seizures, sleepiness, speech difficulty, sweats or tremors. Associated symptoms include blurred vision, foot paresthesias, polydipsia and polyuria. Pertinent negatives for diabetes include no foot ulcerations and no weight loss. Pertinent negatives for hypoglycemia complications include no blackouts, no hospitalization, no nocturnal hypoglycemia, no required assistance and no required glucagon injection. Symptoms are stable. Pertinent negatives for diabetic complications include no CVA, heart disease, nephropathy, peripheral neuropathy, PVD or retinopathy. Risk factors for coronary artery disease include family history, hypertension and stress. Current diabetic treatment includes diet and oral agent (triple therapy). She is compliant with treatment most of the time. Her weight is stable. She is following a generally healthy diet. When asked about meal planning, she reported none. She has not had a previous visit with a dietitian. She participates in exercise daily. She monitors blood glucose at home 1-2 x per day. She monitors urine at home <1 x per month. Blood glucose monitoring compliance is good. Her home blood glucose trend is fluctuating minimally. Her dinner blood glucose range is generally 140-180 mg/dl. Her overall blood glucose  "range is 140-180 mg/dl. She sees a podiatrist.Eye exam is current.       Objective   Vital Signs:  /64   Pulse 73   Temp 97.7 °F (36.5 °C)   Ht 167.6 cm (66\")   Wt 76.6 kg (168 lb 12.8 oz)   SpO2 94%   BMI 27.25 kg/m²   Estimated body mass index is 27.25 kg/m² as calculated from the following:    Height as of this encounter: 167.6 cm (66\").    Weight as of this encounter: 76.6 kg (168 lb 12.8 oz).             Physical Exam  Constitutional:       General: She is not in acute distress.     Appearance: Normal appearance. She is well-developed.   HENT:      Head: Normocephalic and atraumatic.      Right Ear: Tympanic membrane, ear canal and external ear normal.      Left Ear: Tympanic membrane, ear canal and external ear normal.      Mouth/Throat:      Mouth: Mucous membranes are moist.      Pharynx: Oropharynx is clear.   Eyes:      Conjunctiva/sclera: Conjunctivae normal.      Pupils: Pupils are equal, round, and reactive to light.   Neck:      Thyroid: No thyromegaly.   Cardiovascular:      Rate and Rhythm: Normal rate and regular rhythm.      Heart sounds: No murmur heard.  Pulmonary:      Effort: Pulmonary effort is normal.      Breath sounds: Normal breath sounds. No wheezing.   Musculoskeletal:         General: Normal range of motion.      Cervical back: Neck supple.   Lymphadenopathy:      Cervical: No cervical adenopathy.   Skin:     General: Skin is warm and dry.   Neurological:      Mental Status: She is alert and oriented to person, place, and time.   Psychiatric:         Mood and Affect: Mood normal.         Behavior: Behavior normal.        Result Review :  The following data was reviewed by: Meredith Lea Kehrer, MD on 07/20/2023:            Lab 10/23/23  0817   HEMOGLOBIN A1C 7.3*      CBC & Differential (04/19/2023 10:07)  Comprehensive Metabolic Panel (04/19/2023 10:07)  Lipid Panel (04/19/2023 10:07)  TSH (04/19/2023 10:07)         Assessment and Plan   Diagnoses and all orders for this " visit:    1. Type 2 diabetes mellitus without complication, without long-term current use of insulin (Primary)  -     POC Glycosylated Hemoglobin (Hb A1C)  -     pioglitazone (Actos) 30 MG tablet; Take 1 tablet by mouth Daily.  Dispense: 90 tablet; Refill: 3  -     Lipid Panel  -     Comprehensive Metabolic Panel    2. Essential hypertension  -     Lipid Panel  -     Comprehensive Metabolic Panel    3. Hyperlipidemia, unspecified hyperlipidemia type  -     Lipid Panel  -     Comprehensive Metabolic Panel    4. Flu vaccine need  -     Fluzone High-Dose 65+yrs (3562-3336)    5. Need for Tdap vaccination  -     Tdap Vaccine Greater Than or Equal To 6yo IM      Diabetes-fair control, increase pioglitazone  Hypertension-controlled, continue current medication Labs today  Hyperlipidemia-controlled last visit  Routine health maintenance-due for flu and Tdap       Follow Up   Return in about 3 months (around 1/23/2024) for Diabetes Check.  Patient was given instructions and counseling regarding her condition or for health maintenance advice. Please see specific information pulled into the AVS if appropriate.       Answers submitted by the patient for this visit:  Primary Reason for Visit (Submitted on 7/20/2023)  What is the primary reason for your visit?: Diabetes    Answers submitted by the patient for this visit:  Primary Reason for Visit (Submitted on 10/21/2023)  What is the primary reason for your visit?: Diabetes

## 2023-10-24 LAB
ALBUMIN SERPL-MCNC: 4.4 G/DL (ref 3.5–5.2)
ALBUMIN/GLOB SERPL: 2.2 G/DL
ALP SERPL-CCNC: 100 U/L (ref 39–117)
ALT SERPL-CCNC: 10 U/L (ref 1–33)
AST SERPL-CCNC: 16 U/L (ref 1–32)
BILIRUB SERPL-MCNC: 0.6 MG/DL (ref 0–1.2)
BUN SERPL-MCNC: 20 MG/DL (ref 8–23)
BUN/CREAT SERPL: 28.2 (ref 7–25)
CALCIUM SERPL-MCNC: 9.3 MG/DL (ref 8.6–10.5)
CHLORIDE SERPL-SCNC: 105 MMOL/L (ref 98–107)
CHOLEST SERPL-MCNC: 175 MG/DL (ref 0–200)
CO2 SERPL-SCNC: 24.9 MMOL/L (ref 22–29)
CREAT SERPL-MCNC: 0.71 MG/DL (ref 0.57–1)
EGFRCR SERPLBLD CKD-EPI 2021: 89.4 ML/MIN/1.73
GLOBULIN SER CALC-MCNC: 2 GM/DL
GLUCOSE SERPL-MCNC: 130 MG/DL (ref 65–99)
HDLC SERPL-MCNC: 58 MG/DL (ref 40–60)
LDLC SERPL CALC-MCNC: 95 MG/DL (ref 0–100)
POTASSIUM SERPL-SCNC: 4.2 MMOL/L (ref 3.5–5.2)
PROT SERPL-MCNC: 6.4 G/DL (ref 6–8.5)
SODIUM SERPL-SCNC: 141 MMOL/L (ref 136–145)
TRIGL SERPL-MCNC: 126 MG/DL (ref 0–150)
VLDLC SERPL CALC-MCNC: 22 MG/DL (ref 5–40)

## 2023-11-17 ENCOUNTER — OFFICE VISIT (OUTPATIENT)
Dept: FAMILY MEDICINE CLINIC | Facility: CLINIC | Age: 73
End: 2023-11-17
Payer: MEDICARE

## 2023-11-17 ENCOUNTER — TELEPHONE (OUTPATIENT)
Dept: FAMILY MEDICINE CLINIC | Facility: CLINIC | Age: 73
End: 2023-11-17
Payer: MEDICARE

## 2023-11-17 VITALS
BODY MASS INDEX: 27.51 KG/M2 | WEIGHT: 171.2 LBS | OXYGEN SATURATION: 100 % | DIASTOLIC BLOOD PRESSURE: 66 MMHG | HEART RATE: 71 BPM | SYSTOLIC BLOOD PRESSURE: 117 MMHG | HEIGHT: 66 IN

## 2023-11-17 DIAGNOSIS — M79.89 SWELLING OF LEFT LOWER EXTREMITY: Primary | ICD-10-CM

## 2023-11-17 PROCEDURE — 1159F MED LIST DOCD IN RCRD: CPT | Performed by: FAMILY MEDICINE

## 2023-11-17 PROCEDURE — 3078F DIAST BP <80 MM HG: CPT | Performed by: FAMILY MEDICINE

## 2023-11-17 PROCEDURE — 1160F RVW MEDS BY RX/DR IN RCRD: CPT | Performed by: FAMILY MEDICINE

## 2023-11-17 PROCEDURE — 3051F HG A1C>EQUAL 7.0%<8.0%: CPT | Performed by: FAMILY MEDICINE

## 2023-11-17 PROCEDURE — 3074F SYST BP LT 130 MM HG: CPT | Performed by: FAMILY MEDICINE

## 2023-11-17 PROCEDURE — 99213 OFFICE O/P EST LOW 20 MIN: CPT | Performed by: FAMILY MEDICINE

## 2023-11-17 NOTE — PROGRESS NOTES
"Chief Complaint  left leg swollen  (Only change was doubling of Actos )    Subjective        Valery Maynard presents to [facility]  History of Present Illness  Patient of Dr. Kehrer's is here today with a new problem.  She started noticing swelling in her left lower extremity about 2 to 3 days ago.  It is only swollen from the knee down.  She denies any pain in the extremity.  She denies any trauma to the leg.  Patient also denies any chest pain, palpitations, or shortness of breath.  Patient has no history of prior blood clots or coagulopathy.  2 to 3 weeks ago the patient was told to increase her Actos from 30 mg daily to 60 mg daily.  She has been compliant with that recommendation.  Patient denies any other GI or  symptoms.      Objective   Vital Signs:  /66   Pulse 71   Ht 167.6 cm (66\")   Wt 77.7 kg (171 lb 3.2 oz)   SpO2 100%   BMI 27.63 kg/m²   Estimated body mass index is 27.63 kg/m² as calculated from the following:    Height as of this encounter: 167.6 cm (66\").    Weight as of this encounter: 77.7 kg (171 lb 3.2 oz).               Physical Exam  Vitals and nursing note reviewed.   Constitutional:       Appearance: She is well-developed.   HENT:      Head: Normocephalic and atraumatic.      Right Ear: External ear normal.      Left Ear: External ear normal.      Nose: Nose normal.   Eyes:      General: No scleral icterus.     Conjunctiva/sclera: Conjunctivae normal.   Cardiovascular:      Rate and Rhythm: Normal rate and regular rhythm.      Heart sounds: Normal heart sounds.   Pulmonary:      Effort: Pulmonary effort is normal.      Breath sounds: Normal breath sounds.   Musculoskeletal:      Cervical back: Normal range of motion and neck supple.   Skin:     General: Skin is warm and dry.      Findings: No rash.   Neurological:      Mental Status: She is alert and oriented to person, place, and time.   Psychiatric:         Mood and Affect: Mood normal.         " Behavior: Behavior normal.         Thought Content: Thought content normal.         Judgment: Judgment normal.        Result Review :  The following data was reviewed by: Tomasa Manrique DO on 11/17/2023:  Common labs          4/19/2023    09:37 4/19/2023    10:07 7/20/2023    08:23 10/23/2023    08:17 10/23/2023    08:41   Common Labs   Glucose  150    130    BUN  22    20    Creatinine  0.72    0.71    Sodium  146    141    Potassium  4.1    4.2    Chloride  106    105    Calcium  9.7    9.3    Total Protein  6.8    6.4    Albumin  4.5    4.4    Total Bilirubin  0.6    0.6    Alkaline Phosphatase  127    100    AST (SGOT)  16    16    ALT (SGPT)  13    10    WBC  4.91       Hemoglobin  13.6       Hematocrit  41.4       Platelets  158       Total Cholesterol  154    175    Triglycerides  80    126    HDL Cholesterol  53    58    LDL Cholesterol   86    95    Hemoglobin A1C 7.5   7.0  7.3       TSH          4/19/2023    10:07   TSH   TSH 1.450                   Assessment and Plan   Diagnoses and all orders for this visit:    1. Swelling of left lower extremity (Primary)  -     Duplex Venous Lower Extremity - Left CAR; Future    Patient is here today with a new complaint of unilateral left lower extremity swelling.  Stat ultrasound of the left lower extremity was negative for DVT.  Patient was advised to use compression stockings to help with swelling.  If the swelling worsens or persists she was advised to contact her PCP about possibly medication/dosage change.         Follow Up   Return if symptoms worsen or fail to improve, for Routine Follow Up.  Patient was given instructions and counseling regarding her condition or for health maintenance advice. Please see specific information pulled into the AVS if appropriate.

## 2023-11-17 NOTE — TELEPHONE ENCOUNTER
----- Message from Jojo Choudhary MA sent at 11/17/2023 12:45 PM EST -----  Regarding: FW: Proglitazone  Contact: [phone]  PLEASE ADVISE DR. KEHRER PT   ----- Message -----  From: Valery Maynard  Sent: 11/17/2023  12:11 PM EST  To: [facility]  Subject: Proglitazone                                     On my last visit my daily dosage of subject medicine was increased.  I have started having swelling in my left ankle and leg and it says that is one of the side effects of this medication??

## 2023-11-17 NOTE — TELEPHONE ENCOUNTER
Please let patient know that I would not expect swelling and left foot and leg to be from the Actos.  Actos does not have a high risk of swelling and if that is to happen it generally is bad laterally with side effects from medication.  If she is experiencing this and has not had a fall or any other injury I would recommend that she be evaluated further.  She may get in for an appointment or go to the urgent care/ER.

## 2023-11-20 DIAGNOSIS — M79.89 SWELLING OF LEFT LOWER EXTREMITY: ICD-10-CM

## 2023-12-10 DIAGNOSIS — I10 ESSENTIAL (PRIMARY) HYPERTENSION: ICD-10-CM

## 2023-12-11 RX ORDER — IRBESARTAN 300 MG/1
300 TABLET ORAL DAILY
Qty: 90 TABLET | Refills: 0 | Status: SHIPPED | OUTPATIENT
Start: 2023-12-11

## 2023-12-26 DIAGNOSIS — E11.9 TYPE 2 DIABETES MELLITUS WITHOUT COMPLICATIONS: ICD-10-CM

## 2023-12-26 RX ORDER — ATORVASTATIN CALCIUM 20 MG/1
20 TABLET, FILM COATED ORAL DAILY
Qty: 90 TABLET | Refills: 0 | Status: SHIPPED | OUTPATIENT
Start: 2023-12-26

## 2024-01-03 DIAGNOSIS — E11.9 TYPE 2 DIABETES MELLITUS WITHOUT COMPLICATIONS: ICD-10-CM

## 2024-01-23 ENCOUNTER — OFFICE VISIT (OUTPATIENT)
Dept: FAMILY MEDICINE CLINIC | Facility: CLINIC | Age: 74
End: 2024-01-23
Payer: MEDICARE

## 2024-01-23 VITALS
HEIGHT: 66 IN | SYSTOLIC BLOOD PRESSURE: 114 MMHG | HEART RATE: 71 BPM | TEMPERATURE: 98.3 F | DIASTOLIC BLOOD PRESSURE: 64 MMHG | BODY MASS INDEX: 27.38 KG/M2 | WEIGHT: 170.4 LBS | OXYGEN SATURATION: 97 %

## 2024-01-23 DIAGNOSIS — E78.5 HYPERLIPIDEMIA, UNSPECIFIED HYPERLIPIDEMIA TYPE: ICD-10-CM

## 2024-01-23 DIAGNOSIS — R60.0 LOCALIZED EDEMA: ICD-10-CM

## 2024-01-23 DIAGNOSIS — Z79.4 TYPE 2 DIABETES MELLITUS WITHOUT COMPLICATION, WITH LONG-TERM CURRENT USE OF INSULIN: Primary | ICD-10-CM

## 2024-01-23 DIAGNOSIS — E11.9 TYPE 2 DIABETES MELLITUS WITHOUT COMPLICATION, WITH LONG-TERM CURRENT USE OF INSULIN: Primary | ICD-10-CM

## 2024-01-23 DIAGNOSIS — I10 ESSENTIAL HYPERTENSION: ICD-10-CM

## 2024-01-23 LAB
EXPIRATION DATE: ABNORMAL
HBA1C MFR BLD: 7 % (ref 4.5–5.7)
Lab: ABNORMAL

## 2024-01-23 RX ORDER — CANAGLIFLOZIN 100 MG/1
100 TABLET, FILM COATED ORAL DAILY
Qty: 90 TABLET | Refills: 0 | Status: SHIPPED | OUTPATIENT
Start: 2024-01-23

## 2024-01-23 NOTE — PROGRESS NOTES
"Chief Complaint  Med Refill, Diabetes, Leg Swelling, Hyperlipidemia, and Hypertension    Subjective        Valery Maynard presents to [facility]  History of Present Illness  Patient presents for routine follow-up on her diabetes.  She is compliant with her medication.  She has no complaints today except increased lower extremity swelling.  She saw Dr. Manrique about a couple months ago and her Doppler left lower extremity was negative.  Still worse on the left but now she is getting on the right as well.  She realizes it started after she started higher dose of pioglitazone.      Objective   Vital Signs:  /64   Pulse 71   Temp 98.3 °F (36.8 °C)   Ht 167.6 cm (66\")   Wt 77.3 kg (170 lb 6.4 oz)   SpO2 97%   BMI 27.50 kg/m²   Estimated body mass index is 27.5 kg/m² as calculated from the following:    Height as of this encounter: 167.6 cm (66\").    Weight as of this encounter: 77.3 kg (170 lb 6.4 oz).               Physical Exam  Constitutional:       General: She is not in acute distress.     Appearance: Normal appearance. She is well-developed.   HENT:      Head: Normocephalic and atraumatic.      Right Ear: Tympanic membrane, ear canal and external ear normal.      Left Ear: Tympanic membrane, ear canal and external ear normal.      Mouth/Throat:      Mouth: Mucous membranes are moist.      Pharynx: Oropharynx is clear.   Eyes:      Conjunctiva/sclera: Conjunctivae normal.      Pupils: Pupils are equal, round, and reactive to light.   Neck:      Thyroid: No thyromegaly.   Cardiovascular:      Rate and Rhythm: Normal rate and regular rhythm.      Heart sounds: No murmur heard.  Pulmonary:      Effort: Pulmonary effort is normal.      Breath sounds: Normal breath sounds. No wheezing.   Musculoskeletal:         General: Normal range of motion.      Cervical back: Neck supple.      Right lower leg: Edema present.      Left lower leg: Edema present.   Lymphadenopathy:      Cervical: " No cervical adenopathy.   Skin:     General: Skin is warm and dry.   Neurological:      Mental Status: She is alert and oriented to person, place, and time.   Psychiatric:         Mood and Affect: Mood normal.         Behavior: Behavior normal.        Result Review :    The following data was reviewed by: Meredith Lea Kehrer, MD on 01/23/2024:  Most Recent A1C          1/23/2024    09:28   HGBA1C Most Recent   Hemoglobin A1C 7.0                   Assessment and Plan     Diagnoses and all orders for this visit:    1. Type 2 diabetes mellitus without complication, with long-term current use of insulin (Primary)  -     POC Glycosylated Hemoglobin (Hb A1C)  -     Canagliflozin (Invokana) 100 MG tablet tablet; Take 1 tablet by mouth Daily.  Dispense: 90 tablet; Refill: 0  -     CBC & Differential  -     Comprehensive Metabolic Panel  -     TSH    2. Essential hypertension  -     CBC & Differential  -     Comprehensive Metabolic Panel  -     TSH    3. Hyperlipidemia, unspecified hyperlipidemia type    4. Localized edema  -     CBC & Differential  -     Comprehensive Metabolic Panel  -     TSH      Diabetes-controlled enough for age, will stop the pioglitazone and start Invokana with her Januvia and metformin  Hypertension-controlled, continue current medication check labs  Localized edema-side effect of medication, stop p.o. it is       Follow Up     Return in about 3 months (around 4/23/2024) for Diabetes Check, Medicare Wellness.  Patient was given instructions and counseling regarding her condition or for health maintenance advice. Please see specific information pulled into the AVS if appropriate.

## 2024-01-24 LAB
ALBUMIN SERPL-MCNC: 4.1 G/DL (ref 3.5–5.2)
ALBUMIN/GLOB SERPL: 1.9 G/DL
ALP SERPL-CCNC: 103 U/L (ref 39–117)
ALT SERPL-CCNC: 14 U/L (ref 1–33)
AST SERPL-CCNC: 15 U/L (ref 1–32)
BASOPHILS # BLD AUTO: 0.02 10*3/MM3 (ref 0–0.2)
BASOPHILS NFR BLD AUTO: 0.5 % (ref 0–1.5)
BILIRUB SERPL-MCNC: 0.5 MG/DL (ref 0–1.2)
BUN SERPL-MCNC: 19 MG/DL (ref 8–23)
BUN/CREAT SERPL: 24.7 (ref 7–25)
CALCIUM SERPL-MCNC: 9.1 MG/DL (ref 8.6–10.5)
CHLORIDE SERPL-SCNC: 105 MMOL/L (ref 98–107)
CO2 SERPL-SCNC: 28 MMOL/L (ref 22–29)
CREAT SERPL-MCNC: 0.77 MG/DL (ref 0.57–1)
EGFRCR SERPLBLD CKD-EPI 2021: 81.1 ML/MIN/1.73
EOSINOPHIL # BLD AUTO: 0.25 10*3/MM3 (ref 0–0.4)
EOSINOPHIL NFR BLD AUTO: 6.2 % (ref 0.3–6.2)
ERYTHROCYTE [DISTWIDTH] IN BLOOD BY AUTOMATED COUNT: 12.4 % (ref 12.3–15.4)
GLOBULIN SER CALC-MCNC: 2.2 GM/DL
GLUCOSE SERPL-MCNC: 126 MG/DL (ref 65–99)
HCT VFR BLD AUTO: 38.3 % (ref 34–46.6)
HGB BLD-MCNC: 12.6 G/DL (ref 12–15.9)
IMM GRANULOCYTES # BLD AUTO: 0.01 10*3/MM3 (ref 0–0.05)
IMM GRANULOCYTES NFR BLD AUTO: 0.2 % (ref 0–0.5)
LYMPHOCYTES # BLD AUTO: 1.17 10*3/MM3 (ref 0.7–3.1)
LYMPHOCYTES NFR BLD AUTO: 29 % (ref 19.6–45.3)
MCH RBC QN AUTO: 29.4 PG (ref 26.6–33)
MCHC RBC AUTO-ENTMCNC: 32.9 G/DL (ref 31.5–35.7)
MCV RBC AUTO: 89.5 FL (ref 79–97)
MONOCYTES # BLD AUTO: 0.4 10*3/MM3 (ref 0.1–0.9)
MONOCYTES NFR BLD AUTO: 9.9 % (ref 5–12)
NEUTROPHILS # BLD AUTO: 2.19 10*3/MM3 (ref 1.7–7)
NEUTROPHILS NFR BLD AUTO: 54.2 % (ref 42.7–76)
NRBC BLD AUTO-RTO: 0 /100 WBC (ref 0–0.2)
PLATELET # BLD AUTO: 172 10*3/MM3 (ref 140–450)
POTASSIUM SERPL-SCNC: 4.4 MMOL/L (ref 3.5–5.2)
PROT SERPL-MCNC: 6.3 G/DL (ref 6–8.5)
RBC # BLD AUTO: 4.28 10*6/MM3 (ref 3.77–5.28)
SODIUM SERPL-SCNC: 141 MMOL/L (ref 136–145)
TSH SERPL DL<=0.005 MIU/L-ACNC: 1.53 UIU/ML (ref 0.27–4.2)
WBC # BLD AUTO: 4.04 10*3/MM3 (ref 3.4–10.8)

## 2024-02-11 DIAGNOSIS — E11.9 TYPE 2 DIABETES MELLITUS WITHOUT COMPLICATION, WITHOUT LONG-TERM CURRENT USE OF INSULIN: Chronic | ICD-10-CM

## 2024-02-12 RX ORDER — METFORMIN HYDROCHLORIDE 500 MG/1
1500 TABLET, EXTENDED RELEASE ORAL
Qty: 90 TABLET | Refills: 2 | Status: SHIPPED | OUTPATIENT
Start: 2024-02-12

## 2024-04-30 ENCOUNTER — OFFICE VISIT (OUTPATIENT)
Dept: FAMILY MEDICINE CLINIC | Facility: CLINIC | Age: 74
End: 2024-04-30
Payer: MEDICARE

## 2024-04-30 VITALS
OXYGEN SATURATION: 95 % | HEIGHT: 66 IN | BODY MASS INDEX: 26.58 KG/M2 | DIASTOLIC BLOOD PRESSURE: 68 MMHG | HEART RATE: 70 BPM | WEIGHT: 165.4 LBS | SYSTOLIC BLOOD PRESSURE: 122 MMHG | TEMPERATURE: 97.4 F

## 2024-04-30 DIAGNOSIS — E78.5 HYPERLIPIDEMIA, UNSPECIFIED HYPERLIPIDEMIA TYPE: ICD-10-CM

## 2024-04-30 DIAGNOSIS — Z12.11 SCREEN FOR COLON CANCER: ICD-10-CM

## 2024-04-30 DIAGNOSIS — E11.9 TYPE 2 DIABETES MELLITUS WITHOUT COMPLICATION, WITHOUT LONG-TERM CURRENT USE OF INSULIN: ICD-10-CM

## 2024-04-30 DIAGNOSIS — Z00.00 MEDICARE ANNUAL WELLNESS VISIT, SUBSEQUENT: Primary | ICD-10-CM

## 2024-04-30 DIAGNOSIS — Z12.31 VISIT FOR SCREENING MAMMOGRAM: ICD-10-CM

## 2024-04-30 DIAGNOSIS — I10 ESSENTIAL HYPERTENSION: ICD-10-CM

## 2024-04-30 DIAGNOSIS — H91.90 SUBJECTIVE HEARING CHANGE: ICD-10-CM

## 2024-04-30 LAB
EXPIRATION DATE: ABNORMAL
EXPIRATION DATE: ABNORMAL
HBA1C MFR BLD: 6.9 % (ref 4.5–5.7)
Lab: ABNORMAL
Lab: ABNORMAL
POC CREATININE URINE: 26.5
POC MICROALBUMIN URINE: 150

## 2024-04-30 PROCEDURE — 3074F SYST BP LT 130 MM HG: CPT | Performed by: FAMILY MEDICINE

## 2024-04-30 PROCEDURE — 83036 HEMOGLOBIN GLYCOSYLATED A1C: CPT | Performed by: FAMILY MEDICINE

## 2024-04-30 PROCEDURE — 1170F FXNL STATUS ASSESSED: CPT | Performed by: FAMILY MEDICINE

## 2024-04-30 PROCEDURE — 82044 UR ALBUMIN SEMIQUANTITATIVE: CPT | Performed by: FAMILY MEDICINE

## 2024-04-30 PROCEDURE — 3078F DIAST BP <80 MM HG: CPT | Performed by: FAMILY MEDICINE

## 2024-04-30 PROCEDURE — 99214 OFFICE O/P EST MOD 30 MIN: CPT | Performed by: FAMILY MEDICINE

## 2024-04-30 PROCEDURE — 3044F HG A1C LEVEL LT 7.0%: CPT | Performed by: FAMILY MEDICINE

## 2024-04-30 PROCEDURE — G0439 PPPS, SUBSEQ VISIT: HCPCS | Performed by: FAMILY MEDICINE

## 2024-04-30 NOTE — PATIENT INSTRUCTIONS
Medicare Wellness  Personal Prevention Plan of Service     Date of Office Visit:    Encounter Provider:  Meredith Lea Kehrer, MD  Place of Service:  [facility]  Patient Name: Valery Maynard  :  1950    As part of the Medicare Wellness portion of your visit today, we are providing you with this personalized preventive plan of services (PPPS). This plan is based upon recommendations of the [place] Preventive Services Task Force (USPSTF) and the Advisory Committee on Immunization Practices (ACIP).    This lists the preventive care services that should be considered, and provides dates of when you are due. Items listed as completed are up-to-date and do not require any further intervention.    Health Maintenance   Topic Date Due    ZOSTER VACCINE (1 of 2) Never done    RSV Vaccine - Adults (1 - 1-dose 60+ series) Never done    DIABETIC FOOT EXAM  2021    COLORECTAL CANCER SCREENING  2022    COVID-19 Vaccine (3 - -24 season) 2023    ANNUAL WELLNESS VISIT  2024    URINE MICROALBUMIN  2024    INFLUENZA VACCINE  2024    LIPID PANEL  10/23/2024    BMI FOLLOWUP  10/23/2024    DIABETIC EYE EXAM  10/25/2024    HEMOGLOBIN A1C  10/30/2024    MAMMOGRAM  2025    DXA SCAN  2025    TDAP/TD VACCINES (2 - Td or Tdap) 10/23/2033    HEPATITIS C SCREENING  Completed    Pneumococcal Vaccine 65+  Completed       Orders Placed This Encounter   Procedures    TSH     Order Specific Question:   Release to patient     Answer:   Routine Release [[id number]]    Comprehensive Metabolic Panel     Order Specific Question:   Release to patient     Answer:   Routine Release [[id number]]    Lipid Panel     Order Specific Question:   Release to patient     Answer:   Routine Release [[id number]]    Ambulatory Referral For Screening Colonoscopy     Referral Priority:   Routine     Referral Type:   Diagnostic Medical     Referral Reason:   Specialty Services  Required     Referred to Provider:   Dimitri Olivera MD     Number of Visits Requested:   1    POC Glycosylated Hemoglobin (Hb A1C)     Order Specific Question:   Release to patient     Answer:   Routine Release [[id number]]    POC Microalbumin     Order Specific Question:   Release to patient     Answer:   Routine Release [[id number]]    CBC & Differential     Order Specific Question:   Manual Differential     Answer:   No     Order Specific Question:   Release to patient     Answer:   Routine Release [[id number]]       Return in about 6 months (around 10/30/2024) for Diabetes Check.           2014 suzette  infarct ischemia

## 2024-04-30 NOTE — PROGRESS NOTES
The ABCs of the Annual Wellness Visit  Subsequent Medicare Wellness Visit    Subjective    Valery Maynard is a 73 y.o. female who presents for a Subsequent Medicare Wellness Visit.    The following portions of the patient's history were reviewed and   updated as appropriate: allergies, current medications, past family history, past medical history, past social history, past surgical history, and problem list.    Compared to one year ago, the patient feels her physical   health is the same.    Compared to one year ago, the patient feels her mental   health is the same.    Recent Hospitalizations:  She was not admitted to the hospital during the last year.       Current Medical Providers:  Patient Care Team:  Kehrer, Meredith Lea, MD as PCP - General (Family Medicine)    Outpatient Medications Prior to Visit   Medication Sig Dispense Refill    atorvastatin (LIPITOR) 20 MG tablet Take 1 tablet by mouth Daily. 90 tablet 0    Blood Glucose Monitoring Suppl (FreeStyle Lite) device 1 each Daily. 1 each 0    Canagliflozin (Invokana) 100 MG tablet tablet Take 1 tablet by mouth Daily. 90 tablet 0    cetirizine (zyrTEC) 10 MG tablet Take 1 tablet by mouth Daily.      clobetasol (TEMOVATE) 0.05 % external solution Apply  topically to the appropriate area as directed 2 (Two) Times a Day As Needed (itch). 50 mL 0    glucose blood (FREESTYLE LITE) test strip Use as instructed 100 each 3    irbesartan (AVAPRO) 300 MG tablet TAKE 1 TABLET BY MOUTH DAILY 90 tablet 0    ketoconazole (NIZORAL) 2 % shampoo Apply 1 application topically to the appropriate area as directed 2 (Two) Times a Week.      metFORMIN ER (GLUCOPHAGE-XR) 500 MG 24 hr tablet Take 3 tablets by mouth Daily With Breakfast. 90 tablet 2    SITagliptin (Januvia) 100 MG tablet Take 1 tablet by mouth Daily. 90 tablet 0    vitamin B-12 (CYANOCOBALAMIN) 1000 MCG tablet Take 1 tablet by mouth Every Other Day.       No facility-administered medications prior to visit.       No  "opioid medication identified on active medication list. I have reviewed chart for other potential  high risk medication/s and harmful drug interactions in the elderly.        Aspirin is not on active medication list.  Aspirin use is not indicated based on review of current medical condition/s. Risk of harm outweighs potential benefits.  .    Patient Active Problem List   Diagnosis    Positive colorectal cancer screening using DNA-based stool test    Renal calculus, left    Essential hypertension    Hyperlipidemia    Type 2 diabetes mellitus without complication, without long-term current use of insulin    Seborrheic dermatitis of scalp    Other fatigue     Advance Care Planning   Advance Care Planning     Advance Directive is on file.  ACP discussion was held with the patient during this visit. Patient has an advance directive in EMR which is still valid.      Objective    Vitals:    24 0933   BP: 122/68   Pulse: 70   Temp: 97.4 °F (36.3 °C)   TempSrc: Temporal   SpO2: 95%   Weight: 75 kg (165 lb 6.4 oz)   Height: 167.6 cm (65.98\")     Estimated body mass index is 26.71 kg/m² as calculated from the following:    Height as of this encounter: 167.6 cm (65.98\").    Weight as of this encounter: 75 kg (165 lb 6.4 oz).           Does the patient have evidence of cognitive impairment? No    Lab Results   Component Value Date    HGBA1C 6.9 (A) 2024        HEALTH RISK ASSESSMENT    Smoking Status:  Social History     Tobacco Use   Smoking Status Former    Current packs/day: 0.00    Average packs/day: 0.5 packs/day for 5.0 years (2.5 ttl pk-yrs)    Types: Cigarettes    Start date:     Quit date:     Years since quittin.3    Passive exposure: Past   Smokeless Tobacco Never   Tobacco Comments    QUIT 25 YRS AGO-was 1 ppd for 10-15 years     Alcohol Consumption:  Social History     Substance and Sexual Activity   Alcohol Use Not Currently    Comment: RARE     Fall Risk Screen:    CHADDADI Fall Risk " Assessment was completed, and patient is at LOW risk for falls.Assessment completed on:2024    Depression Screenin/30/2024     9:30 AM   PHQ-2/PHQ-9 Depression Screening   Little Interest or Pleasure in Doing Things 0-->not at all   Feeling Down, Depressed or Hopeless 0-->not at all   PHQ-9: Brief Depression Severity Measure Score 0       Health Habits and Functional and Cognitive Screenin/30/2024     9:31 AM   Functional & Cognitive Status   Do you have difficulty preparing food and eating? No   Do you have difficulty bathing yourself, getting dressed or grooming yourself? No   Do you have difficulty using the toilet? No   Do you have difficulty moving around from place to place? No   Do you have trouble with steps or getting out of a bed or a chair? No   Current Diet Well Balanced Diet   Dental Exam Up to date   Eye Exam Up to date   Exercise (times per week) 6 times per week   Current Exercises Include Walking   Do you need help using the phone?  No   Are you deaf or do you have serious difficulty hearing?  Yes   Do you need help to go to places out of walking distance? No   Do you need help shopping? No   Do you need help preparing meals?  No   Do you need help with housework?  No   Do you need help with laundry? No   Do you need help taking your medications? No   Do you need help managing money? No   Do you ever drive or ride in a car without wearing a seat belt? No   Have you felt unusual stress, anger or loneliness in the last month? No   Who do you live with? Spouse   If you need help, do you have trouble finding someone available to you? No   Have you been bothered in the last four weeks by sexual problems? No   Do you have difficulty concentrating, remembering or making decisions? No       Age-appropriate Screening Schedule:  Refer to the list below for future screening recommendations based on patient's age, sex and/or medical conditions. Orders for these recommended tests are  listed in the plan section. The patient has been provided with a written plan.    Health Maintenance   Topic Date Due    ZOSTER VACCINE (1 of 2) Never done    RSV Vaccine - Adults (1 - 1-dose 60+ series) Never done    DIABETIC FOOT EXAM  09/21/2021    COLORECTAL CANCER SCREENING  08/31/2022    COVID-19 Vaccine (3 - 2023-24 season) 09/01/2023    ANNUAL WELLNESS VISIT  04/19/2024    INFLUENZA VACCINE  08/01/2024    LIPID PANEL  10/23/2024    BMI FOLLOWUP  10/23/2024    DIABETIC EYE EXAM  10/25/2024    HEMOGLOBIN A1C  10/30/2024    URINE MICROALBUMIN  04/30/2025    MAMMOGRAM  05/02/2025    DXA SCAN  05/02/2025    TDAP/TD VACCINES (2 - Td or Tdap) 10/23/2033    HEPATITIS C SCREENING  Completed    Pneumococcal Vaccine 65+  Completed                  CMS Preventative Services Quick Reference  Risk Factors Identified During Encounter  Hearing Problem: Referral to Audiologist ordered  The above risks/problems have been discussed with the patient.  Pertinent information has been shared with the patient in the After Visit Summary.  An After Visit Summary and PPPS were made available to the patient.    Follow Up:   Next Medicare Wellness visit to be scheduled in 1 year.       Additional E&M Note during same encounter follows:  Patient has multiple medical problems which are significant and separately identifiable that require additional work above and beyond the Medicare Wellness Visit.      Chief Complaint  Medicare Wellness-subsequent and Diabetes    Subjective        HPI    Valery Maynard is also being seen today for follow up.  History of Present Illness  The patient is a 69-year-old female here for Medicare wellness and follow-up on diabetes, hypertension, and other medical problems.    The patient reports a general sense of well-being, adhering to her prescribed medication regimen without any adverse effects. She denies experiencing any head or neck symptoms, with the exception of decreased hearing. She denies  "experiencing any chest pain, shortness of breath, nausea, vomiting, alterations in bowel or bladder habits, or unusual skin spots.  She does have some easy bruising on her hands.    The patient acknowledges suboptimal dietary habits. She expresses a desire to maintain her current weight, citing previous leg swelling following an increased dosage of amlodipine. Her current medication regimen includes Januvia and metformin for diabetes management.    The patient adheres to her prescribed atorvastatin regimen every other day. However, she reports experiencing leg cramps when taken daily. She is uncertain about the need for medication refills. She believes she has received the RSV vaccine at [facility].    Supplemental Information  She denies any ringing in her ears. She has to increase the volume on things and can not hear what people say or have trouble at a restaurant.            Objective   Vital Signs:  /68   Pulse 70   Temp 97.4 °F (36.3 °C) (Temporal)   Ht 167.6 cm (65.98\")   Wt 75 kg (165 lb 6.4 oz)   SpO2 95%   BMI 26.71 kg/m²     Physical Exam  Vitals and nursing note reviewed.   Constitutional:       General: She is not in acute distress.     Appearance: Normal appearance. She is well-developed.   HENT:      Head: Normocephalic and atraumatic.      Right Ear: Tympanic membrane, ear canal and external ear normal.      Left Ear: Tympanic membrane, ear canal and external ear normal.      Nose: Nose normal.      Mouth/Throat:      Mouth: Mucous membranes are moist.      Pharynx: Oropharynx is clear. No oropharyngeal exudate or posterior oropharyngeal erythema.   Eyes:      Conjunctiva/sclera: Conjunctivae normal.      Pupils: Pupils are equal, round, and reactive to light.   Neck:      Thyroid: No thyromegaly.   Cardiovascular:      Rate and Rhythm: Normal rate and regular rhythm.      Heart sounds: No murmur heard.  Pulmonary:      Effort: Pulmonary effort is normal.      Breath sounds: " Normal breath sounds. No wheezing.   Abdominal:      General: Abdomen is flat. Bowel sounds are normal. There is no distension.      Palpations: Abdomen is soft. There is no mass.      Tenderness: There is no abdominal tenderness.      Hernia: No hernia is present.   Musculoskeletal:         General: No swelling. Normal range of motion.      Cervical back: Normal range of motion and neck supple.      Right lower leg: No edema.      Left lower leg: No edema.   Lymphadenopathy:      Cervical: No cervical adenopathy.   Skin:     General: Skin is warm and dry.      Capillary Refill: Capillary refill takes less than 2 seconds.      Findings: No rash.   Neurological:      General: No focal deficit present.      Mental Status: She is alert and oriented to person, place, and time.      Cranial Nerves: No cranial nerve deficit.   Psychiatric:         Mood and Affect: Mood normal.         Behavior: Behavior normal.        Physical Exam  Vital Signs  The patient's blood pressure is 122/68.      The following data was reviewed by: Meredith Lea Kehrer, MD on 04/30/2024:  Most Recent A1C          4/30/2024    09:36   HGBA1C Most Recent   Hemoglobin A1C 6.9            Results  Laboratory Studies  A1c is 6.9.          Assessment and Plan   Diagnoses and all orders for this visit:    1. Medicare annual wellness visit, subsequent (Primary)    2. Type 2 diabetes mellitus without complication, without long-term current use of insulin  -     POC Glycosylated Hemoglobin (Hb A1C)  -     POC Microalbumin  -     TSH  -     CBC & Differential  -     Comprehensive Metabolic Panel  -     Lipid Panel    3. Essential hypertension  -     TSH  -     CBC & Differential  -     Comprehensive Metabolic Panel  -     Lipid Panel    4. Hyperlipidemia, unspecified hyperlipidemia type  -     TSH  -     CBC & Differential  -     Comprehensive Metabolic Panel  -     Lipid Panel    5. Subjective hearing change    6. Screen for colon cancer  -     Ambulatory  Referral For Screening Colonoscopy    7. Visit for screening mammogram  -     Mammo Screening Digital Tomosynthesis Bilateral With CAD; Future      Assessment & Plan  1. Medicare wellness.  She was strongly advised to schedule an appointment with an audiologist at [facility] for potential discounts on hearing aids. Laboratory tests will be conducted today, and a urine test will be conducted to screen for protein.  She is due for a colonoscopy, with her last one being 6 years ago performed by Dr. Pena. A referral will be made to Dr. Pena for a colonoscopy.  Mammogram will be scheduled.    2. Hypertension.  The patient's blood pressure is well-managed.    3. Diabetes.  Controlled.    Follow-up  The patient is scheduled for a follow-up visit in 6 months.            Follow Up   Return in about 6 months (around 10/30/2024) for Diabetes Check.  Patient was given instructions and counseling regarding her condition or for health maintenance advice. Please see specific information pulled into the AVS if appropriate.     Patient or patient representative verbalized consent for the use of Ambient Listening during the visit with  Meredith Lea Kehrer, MD for chart documentation. 4/30/2024  09:51 EDT

## 2024-05-01 LAB
ALBUMIN SERPL-MCNC: 4.2 G/DL (ref 3.5–5.2)
ALBUMIN/GLOB SERPL: 1.8 G/DL
ALP SERPL-CCNC: 121 U/L (ref 39–117)
ALT SERPL-CCNC: 10 U/L (ref 1–33)
AST SERPL-CCNC: 14 U/L (ref 1–32)
BASOPHILS # BLD AUTO: 0.03 10*3/MM3 (ref 0–0.2)
BASOPHILS NFR BLD AUTO: 0.7 % (ref 0–1.5)
BILIRUB SERPL-MCNC: 0.5 MG/DL (ref 0–1.2)
BUN SERPL-MCNC: 18 MG/DL (ref 8–23)
BUN/CREAT SERPL: 25 (ref 7–25)
CALCIUM SERPL-MCNC: 9.2 MG/DL (ref 8.6–10.5)
CHLORIDE SERPL-SCNC: 106 MMOL/L (ref 98–107)
CHOLEST SERPL-MCNC: 169 MG/DL (ref 0–200)
CO2 SERPL-SCNC: 26.8 MMOL/L (ref 22–29)
CREAT SERPL-MCNC: 0.72 MG/DL (ref 0.57–1)
EGFRCR SERPLBLD CKD-EPI 2021: 87.9 ML/MIN/1.73
EOSINOPHIL # BLD AUTO: 0.24 10*3/MM3 (ref 0–0.4)
EOSINOPHIL NFR BLD AUTO: 5.6 % (ref 0.3–6.2)
ERYTHROCYTE [DISTWIDTH] IN BLOOD BY AUTOMATED COUNT: 11.9 % (ref 12.3–15.4)
GLOBULIN SER CALC-MCNC: 2.3 GM/DL
GLUCOSE SERPL-MCNC: 138 MG/DL (ref 65–99)
HCT VFR BLD AUTO: 43.2 % (ref 34–46.6)
HDLC SERPL-MCNC: 60 MG/DL (ref 40–60)
HGB BLD-MCNC: 13.8 G/DL (ref 12–15.9)
IMM GRANULOCYTES # BLD AUTO: 0.01 10*3/MM3 (ref 0–0.05)
IMM GRANULOCYTES NFR BLD AUTO: 0.2 % (ref 0–0.5)
LDLC SERPL CALC-MCNC: 86 MG/DL (ref 0–100)
LYMPHOCYTES # BLD AUTO: 1.18 10*3/MM3 (ref 0.7–3.1)
LYMPHOCYTES NFR BLD AUTO: 27.6 % (ref 19.6–45.3)
MCH RBC QN AUTO: 30.1 PG (ref 26.6–33)
MCHC RBC AUTO-ENTMCNC: 31.9 G/DL (ref 31.5–35.7)
MCV RBC AUTO: 94.3 FL (ref 79–97)
MONOCYTES # BLD AUTO: 0.42 10*3/MM3 (ref 0.1–0.9)
MONOCYTES NFR BLD AUTO: 9.8 % (ref 5–12)
NEUTROPHILS # BLD AUTO: 2.39 10*3/MM3 (ref 1.7–7)
NEUTROPHILS NFR BLD AUTO: 56.1 % (ref 42.7–76)
NRBC BLD AUTO-RTO: 0 /100 WBC (ref 0–0.2)
PLATELET # BLD AUTO: 146 10*3/MM3 (ref 140–450)
POTASSIUM SERPL-SCNC: 4.2 MMOL/L (ref 3.5–5.2)
PROT SERPL-MCNC: 6.5 G/DL (ref 6–8.5)
RBC # BLD AUTO: 4.58 10*6/MM3 (ref 3.77–5.28)
SODIUM SERPL-SCNC: 143 MMOL/L (ref 136–145)
TRIGL SERPL-MCNC: 132 MG/DL (ref 0–150)
TSH SERPL DL<=0.005 MIU/L-ACNC: 1.95 UIU/ML (ref 0.27–4.2)
VLDLC SERPL CALC-MCNC: 23 MG/DL (ref 5–40)
WBC # BLD AUTO: 4.27 10*3/MM3 (ref 3.4–10.8)

## 2024-05-06 DIAGNOSIS — Z12.31 VISIT FOR SCREENING MAMMOGRAM: ICD-10-CM

## 2024-05-09 DIAGNOSIS — E11.9 TYPE 2 DIABETES MELLITUS WITHOUT COMPLICATION, WITHOUT LONG-TERM CURRENT USE OF INSULIN: Chronic | ICD-10-CM

## 2024-05-09 RX ORDER — METFORMIN HYDROCHLORIDE 500 MG/1
TABLET, EXTENDED RELEASE ORAL
Qty: 90 TABLET | Refills: 2 | Status: SHIPPED | OUTPATIENT
Start: 2024-05-09

## 2024-06-05 DIAGNOSIS — I10 ESSENTIAL (PRIMARY) HYPERTENSION: ICD-10-CM

## 2024-06-05 RX ORDER — IRBESARTAN 300 MG/1
300 TABLET ORAL DAILY
Qty: 90 TABLET | Refills: 0 | Status: SHIPPED | OUTPATIENT
Start: 2024-06-05

## 2024-06-27 DIAGNOSIS — E11.9 TYPE 2 DIABETES MELLITUS WITHOUT COMPLICATIONS: ICD-10-CM

## 2024-06-27 RX ORDER — SITAGLIPTIN 100 MG/1
100 TABLET, FILM COATED ORAL DAILY
Qty: 90 TABLET | Refills: 0 | Status: SHIPPED | OUTPATIENT
Start: 2024-06-27

## 2024-08-03 DIAGNOSIS — E11.9 TYPE 2 DIABETES MELLITUS WITHOUT COMPLICATION, WITHOUT LONG-TERM CURRENT USE OF INSULIN: Chronic | ICD-10-CM

## 2024-08-05 RX ORDER — METFORMIN HYDROCHLORIDE 500 MG/1
TABLET, EXTENDED RELEASE ORAL
Qty: 90 TABLET | Refills: 0 | Status: SHIPPED | OUTPATIENT
Start: 2024-08-05

## 2024-08-16 DIAGNOSIS — E11.9 TYPE 2 DIABETES MELLITUS WITHOUT COMPLICATIONS: ICD-10-CM

## 2024-08-16 RX ORDER — ATORVASTATIN CALCIUM 20 MG/1
20 TABLET, FILM COATED ORAL DAILY
Qty: 90 TABLET | Refills: 1 | Status: SHIPPED | OUTPATIENT
Start: 2024-08-16

## 2024-08-16 NOTE — TELEPHONE ENCOUNTER
Rx Refill Note  Requested Prescriptions     Pending Prescriptions Disp Refills    atorvastatin (LIPITOR) 20 MG tablet [Pharmacy Med Name: ATORVASTATIN 20 MG TABLET] 90 tablet 0     Sig: TAKE 1 TABLET BY MOUTH DAILY      Last office visit with prescribing clinician: 4/30/2024   Last telemedicine visit with prescribing clinician: Visit date not found   Next office visit with prescribing clinician: 10/30/2024                         Would you like a call back once the refill request has been completed: [] Yes [] No    If the office needs to give you a call back, can they leave a voicemail: [] Yes [] No    Hodan Sanchez MA  08/16/24, 07:20 EDT

## 2024-09-12 DIAGNOSIS — I10 ESSENTIAL (PRIMARY) HYPERTENSION: ICD-10-CM

## 2024-09-12 DIAGNOSIS — E11.9 TYPE 2 DIABETES MELLITUS WITHOUT COMPLICATION, WITHOUT LONG-TERM CURRENT USE OF INSULIN: Chronic | ICD-10-CM

## 2024-09-12 RX ORDER — METFORMIN HCL 500 MG
500 TABLET, EXTENDED RELEASE 24 HR ORAL
Qty: 90 TABLET | Refills: 0 | Status: SHIPPED | OUTPATIENT
Start: 2024-09-12

## 2024-09-12 RX ORDER — IRBESARTAN 300 MG/1
300 TABLET ORAL DAILY
Qty: 90 TABLET | Refills: 0 | Status: SHIPPED | OUTPATIENT
Start: 2024-09-12

## 2024-09-12 NOTE — TELEPHONE ENCOUNTER
Rx Refill Note  Requested Prescriptions     Pending Prescriptions Disp Refills    irbesartan (AVAPRO) 300 MG tablet 90 tablet 0     Sig: Take 1 tablet by mouth Daily.    metFORMIN ER (GLUCOPHAGE-XR) 500 MG 24 hr tablet 90 tablet 0     Sig: Take 1 tablet by mouth Daily With Breakfast.      Last office visit with prescribing clinician: 4/30/2024   Last telemedicine visit with prescribing clinician: Visit date not found   Next office visit with prescribing clinician: 10/30/2024                         Would you like a call back once the refill request has been completed: [] Yes [] No    If the office needs to give you a call back, can they leave a voicemail: [] Yes [] No    Hodan Sanchez MA  09/12/24, 14:26 EDT

## 2024-09-22 DIAGNOSIS — E11.9 TYPE 2 DIABETES MELLITUS WITHOUT COMPLICATIONS: ICD-10-CM

## 2024-09-23 RX ORDER — SITAGLIPTIN 100 MG/1
100 TABLET, FILM COATED ORAL DAILY
Qty: 90 TABLET | Refills: 0 | Status: SHIPPED | OUTPATIENT
Start: 2024-09-23

## 2024-10-12 DIAGNOSIS — E11.9 TYPE 2 DIABETES MELLITUS WITHOUT COMPLICATION, WITHOUT LONG-TERM CURRENT USE OF INSULIN: Chronic | ICD-10-CM

## 2024-10-14 RX ORDER — METFORMIN HCL 500 MG
TABLET, EXTENDED RELEASE 24 HR ORAL
Qty: 90 TABLET | Refills: 0 | Status: SHIPPED | OUTPATIENT
Start: 2024-10-14

## 2024-10-14 NOTE — TELEPHONE ENCOUNTER
Rx Refill Note  Requested Prescriptions     Pending Prescriptions Disp Refills    metFORMIN ER (GLUCOPHAGE-XR) 500 MG 24 hr tablet [Pharmacy Med Name: METFORMIN HCL  MG TABLET] 90 tablet 0     Sig: TAKE THREE TABLETS BY MOUTH DAILY WITH BREAKFAST      Last office visit with prescribing clinician: 4/30/2024   Last telemedicine visit with prescribing clinician: Visit date not found   Next office visit with prescribing clinician: 10/30/2024                         Would you like a call back once the refill request has been completed: [] Yes [] No    If the office needs to give you a call back, can they leave a voicemail: [] Yes [] No    Hodan Sanchez MA  10/14/24, 07:21 EDT

## 2024-10-30 ENCOUNTER — OFFICE VISIT (OUTPATIENT)
Dept: FAMILY MEDICINE CLINIC | Facility: CLINIC | Age: 74
End: 2024-10-30
Payer: MEDICARE

## 2024-10-30 VITALS
BODY MASS INDEX: 26 KG/M2 | SYSTOLIC BLOOD PRESSURE: 110 MMHG | HEART RATE: 68 BPM | OXYGEN SATURATION: 98 % | TEMPERATURE: 97.9 F | DIASTOLIC BLOOD PRESSURE: 64 MMHG | WEIGHT: 161.8 LBS | HEIGHT: 66 IN

## 2024-10-30 DIAGNOSIS — I10 ESSENTIAL HYPERTENSION: ICD-10-CM

## 2024-10-30 DIAGNOSIS — E11.9 TYPE 2 DIABETES MELLITUS WITHOUT COMPLICATION, WITHOUT LONG-TERM CURRENT USE OF INSULIN: Primary | ICD-10-CM

## 2024-10-30 DIAGNOSIS — Z23 FLU VACCINE NEED: ICD-10-CM

## 2024-10-30 DIAGNOSIS — Z97.4 HEARING AID WORN: ICD-10-CM

## 2024-10-30 DIAGNOSIS — E78.5 HYPERLIPIDEMIA, UNSPECIFIED HYPERLIPIDEMIA TYPE: ICD-10-CM

## 2024-10-30 LAB
EXPIRATION DATE: ABNORMAL
HBA1C MFR BLD: 7.1 % (ref 4.5–5.7)
Lab: ABNORMAL

## 2024-10-30 PROCEDURE — 3051F HG A1C>EQUAL 7.0%<8.0%: CPT | Performed by: FAMILY MEDICINE

## 2024-10-30 PROCEDURE — 1126F AMNT PAIN NOTED NONE PRSNT: CPT | Performed by: FAMILY MEDICINE

## 2024-10-30 PROCEDURE — 90662 IIV NO PRSV INCREASED AG IM: CPT | Performed by: FAMILY MEDICINE

## 2024-10-30 PROCEDURE — 99214 OFFICE O/P EST MOD 30 MIN: CPT | Performed by: FAMILY MEDICINE

## 2024-10-30 PROCEDURE — G0008 ADMIN INFLUENZA VIRUS VAC: HCPCS | Performed by: FAMILY MEDICINE

## 2024-10-30 PROCEDURE — 3074F SYST BP LT 130 MM HG: CPT | Performed by: FAMILY MEDICINE

## 2024-10-30 PROCEDURE — 83036 HEMOGLOBIN GLYCOSYLATED A1C: CPT | Performed by: FAMILY MEDICINE

## 2024-10-30 PROCEDURE — 3078F DIAST BP <80 MM HG: CPT | Performed by: FAMILY MEDICINE

## 2024-10-30 RX ORDER — METFORMIN HYDROCHLORIDE 500 MG/1
1500 TABLET, EXTENDED RELEASE ORAL
Qty: 270 TABLET | Refills: 3 | Status: SHIPPED | OUTPATIENT
Start: 2024-10-30

## 2024-10-30 NOTE — PROGRESS NOTES
"Chief Complaint  Med Refill, Diabetes, Hypertension, Hyperlipidemia, and feet and leg pain    Subjective        Valery Maynard presents to [facility]  History of Present Illness  History of Present Illness  The patient is a 74-year-old female who presents for follow-up on hypertension and diabetes.    She reports feeling generally well but notes a decrease in her appetite, often not eating until late morning. She is currently on a regimen of Invokana, metformin, and Januvia, taking three metformin tablets daily. A medication, pioglitazone, was discontinued due to causing swelling in her leg and ankle.    She experiences tingling in her feet when lying down, but it does not disturb her sleep. She has recently started using hearing aids, which she finds beneficial.    She has been engaging in regular physical activity, including walking on a treadmill and using 3-pound weights. She also walks her dog for a mile around her neighborhood.       Objective   Vital Signs:  /64   Pulse 68   Temp 97.9 °F (36.6 °C)   Ht 167.6 cm (66\")   Wt 73.4 kg (161 lb 12.8 oz)   SpO2 98%   BMI 26.12 kg/m²   Estimated body mass index is 26.12 kg/m² as calculated from the following:    Height as of this encounter: 167.6 cm (66\").    Weight as of this encounter: 73.4 kg (161 lb 12.8 oz).             Physical Exam   Physical Exam  Patient is alert.  Pupils are equal.  Neck is supple.  Lungs are clear.  Heart rhythm is regular.    Vital Signs  Blood pressure reading is 110/64.       Result Review :          Results  Laboratory Studies  A1c is at 7.1.              Assessment and Plan     Diagnoses and all orders for this visit:    1. Type 2 diabetes mellitus without complication, without long-term current use of insulin (Primary)  -     POC Glycosylated Hemoglobin (Hb A1C)  -     Comprehensive Metabolic Panel  -     Lipid Panel  -     metFORMIN ER (GLUCOPHAGE-XR) 500 MG 24 hr tablet; Take 3 tablets by " mouth Daily With Breakfast.  Dispense: 270 tablet; Refill: 3    2. Essential hypertension  -     Comprehensive Metabolic Panel  -     Lipid Panel    3. Hyperlipidemia, unspecified hyperlipidemia type  -     Comprehensive Metabolic Panel  -     Lipid Panel    4. Flu vaccine need  -     Fluzone High-Dose 65+yrs (0686-1199)    5. Hearing aid worn      Assessment & Plan  1. Hypertension.  Her blood pressure is well-controlled today at 110/64. She has experienced a weight loss of 9 pounds since January 2024 and 10 pounds since November 2023. She should continue her current regimen and incorporate weightlifting into her exercise routine to prevent muscle loss.    2. Diabetes Mellitus.  Her A1c level is 7.1, which is slightly elevated from the previous measurement but acceptable for her age group. She should continue her current regimen of Invokana, metformin, and Januvia. If Januvia becomes unaffordable, a trial off the medication can be considered. Fasting blood work will be conducted today. If the tingling sensation in her feet persists, thyroid and B12 levels will be re-evaluated during the annual Medicare wellness visit in 6 months.    3. Medication Management.  A 3-month supply of metformin will be provided to ensure she does not run out of medication.    4. Health Maintenance.  She is due for an influenza vaccine, which will be administered today. She is advised to receive the COVID-19 booster and RSV vaccines at the pharmacy. The shingles vaccine should be taken separately from other vaccines, with the first dose in a few weeks and the second dose two months later. A routine lipid panel and chemistry will be conducted today as her previous lab results were satisfactory.    Follow-up  Return in 6 months for her Medicare wellness visit.            Follow Up     Return in about 6 months (around 4/30/2025) for Medicare Wellness.  Patient was given instructions and counseling regarding her condition or for health  maintenance advice. Please see specific information pulled into the AVS if appropriate.    Patient or patient representative verbalized consent for the use of Ambient Listening during the visit with  Meredith Lea Kehrer, MD for chart documentation. 10/30/2024  09:35 EDT

## 2024-10-31 LAB
ALBUMIN SERPL-MCNC: 4.3 G/DL (ref 3.8–4.8)
ALP SERPL-CCNC: 132 IU/L (ref 44–121)
ALT SERPL-CCNC: 12 IU/L (ref 0–32)
AST SERPL-CCNC: 17 IU/L (ref 0–40)
BILIRUB SERPL-MCNC: 0.5 MG/DL (ref 0–1.2)
BUN SERPL-MCNC: 22 MG/DL (ref 8–27)
BUN/CREAT SERPL: 29 (ref 12–28)
CALCIUM SERPL-MCNC: 9.5 MG/DL (ref 8.7–10.3)
CHLORIDE SERPL-SCNC: 105 MMOL/L (ref 96–106)
CHOLEST SERPL-MCNC: 158 MG/DL (ref 100–199)
CO2 SERPL-SCNC: 22 MMOL/L (ref 20–29)
CREAT SERPL-MCNC: 0.75 MG/DL (ref 0.57–1)
EGFRCR SERPLBLD CKD-EPI 2021: 83 ML/MIN/1.73
GLOBULIN SER CALC-MCNC: 2.4 G/DL (ref 1.5–4.5)
GLUCOSE SERPL-MCNC: 147 MG/DL (ref 70–99)
HDLC SERPL-MCNC: 47 MG/DL
LDLC SERPL CALC-MCNC: 80 MG/DL (ref 0–99)
POTASSIUM SERPL-SCNC: 4.5 MMOL/L (ref 3.5–5.2)
PROT SERPL-MCNC: 6.7 G/DL (ref 6–8.5)
SODIUM SERPL-SCNC: 141 MMOL/L (ref 134–144)
TRIGL SERPL-MCNC: 183 MG/DL (ref 0–149)
VLDLC SERPL CALC-MCNC: 31 MG/DL (ref 5–40)

## 2024-12-08 DIAGNOSIS — I10 ESSENTIAL (PRIMARY) HYPERTENSION: ICD-10-CM

## 2024-12-09 RX ORDER — IRBESARTAN 300 MG/1
300 TABLET ORAL DAILY
Qty: 90 TABLET | Refills: 0 | Status: SHIPPED | OUTPATIENT
Start: 2024-12-09

## 2024-12-09 NOTE — TELEPHONE ENCOUNTER
Rx Refill Note  Requested Prescriptions     Pending Prescriptions Disp Refills    irbesartan (AVAPRO) 300 MG tablet [Pharmacy Med Name: IRBESARTAN 300 MG TABLET] 90 tablet 0     Sig: TAKE 1 TABLET BY MOUTH DAILY      Last office visit with prescribing clinician: 10/30/2024   Last telemedicine visit with prescribing clinician: Visit date not found   Next office visit with prescribing clinician: 5/1/2025                         Would you like a call back once the refill request has been completed: [] Yes [] No    If the office needs to give you a call back, can they leave a voicemail: [] Yes [] No    Carmenza Agustin MA  12/09/24, 07:48 EST     Subjective:      Patient ID: Aggie Horton is a 37 y.o. female.    Chief Complaint: Sinus Problem (nose all stoped up, ears are cloged, and throat hurts , started 9 /15/2019)    HPI    X 1 week   Sinus pressure   Nasal congestion   Headache   Sore throat - severe - using chloroseptic   Cough - productive - mucous with green and red streaks   No muscle or body aches   Left ear pain radiating down neck - decreased hearing   Has taken many over the counter medication - mucinex, tylenol cold/sinus pressure, theraflu, robitussin, ibuprofen     Past Medical History:   Diagnosis Date    Hypertension     Insulin resistance     KELLY on CPAP        Past Surgical History:   Procedure Laterality Date    CHOLECYSTECTOMY         Family History   Problem Relation Age of Onset    Hypertension Mother     Hypertension Father     Colon cancer Maternal Grandmother     Diabetes Neg Hx     Breast cancer Neg Hx        Social History     Socioeconomic History    Marital status: Single     Spouse name: Not on file    Number of children: 0    Years of education: Not on file    Highest education level: Not on file   Occupational History    Occupation:      Comment: [employer]   Social Needs    Financial resource strain: Somewhat hard    Food insecurity:     Worry: Sometimes true     Inability: Not on file    Transportation needs:     Medical: Not on file     Non-medical: No   Tobacco Use    Smoking status: Never Smoker    Smokeless tobacco: Never Used   Substance and Sexual Activity    Alcohol use: Yes     Frequency: Monthly or less     Drinks per session: 1 or 2     Binge frequency: Never     Comment: occasionally    Drug use: No    Sexual activity: Yes     Partners: Male     Birth control/protection: None     Comment: 1 partner   Lifestyle    Physical activity:     Days per week: 3 days     Minutes per session: 30 min    Stress: To some extent   Relationships    Social connections:      "Talks on phone: More than three times a week     Gets together: Twice a week     Attends Worship service: More than 4 times per year     Active member of club or organization: Yes     Attends meetings of clubs or organizations: 1 to 4 times per year     Relationship status: Never    Other Topics Concern    Not on file   Social History Narrative    She wears seatbelt.       Health Maintenance Topics with due status: Not Due       Topic Last Completion Date    Colonoscopy 06/02/2015    Pap Smear with HPV Cotest 08/03/2018       Medication List with Changes/Refills   New Medications    AMOXICILLIN-CLAVULANATE 875-125MG (AUGMENTIN) 875-125 MG PER TABLET    Take 1 tablet by mouth every 12 (twelve) hours. for 10 days    BENZONATATE (TESSALON) 100 MG CAPSULE    Take 1 capsule (100 mg total) by mouth 3 (three) times daily as needed for Cough.    FLUCONAZOLE (DIFLUCAN) 150 MG TAB    Take 1 tablet (150 mg total) by mouth once daily. yeast for 1 day    PROMETHAZINE-DEXTROMETHORPHAN (PROMETHAZINE-DM) 6.25-15 MG/5 ML SYRP    Take 5 mLs by mouth nightly as needed (cough).   Current Medications    AMMONIUM LACTATE 12 % CREA    Apply topically twice daily    FERROUS SULFATE (IRON) 325 MG (65 MG IRON) TAB TABLET    Take 325 mg by mouth daily with breakfast.    LIRAGLUTIDE 0.6 MG/0.1 ML, 18 MG/3 ML, SUBQ PNIJ (VICTOZA 2-BLAYNE) 0.6 MG/0.1 ML (18 MG/3 ML) PNIJ    Inject 1.2 mg into the skin once daily.    LOSARTAN-HYDROCHLOROTHIAZIDE 50-12.5 MG (HYZAAR) 50-12.5 MG PER TABLET    Take 1 tablet by mouth once daily.    PEN NEEDLE, DIABETIC (BD ULTRA-FINE WILL PEN NEEDLES) 32 GAUGE X 5/32" NDLE    Use daily    TRIAMCINOLONE ACETONIDE 0.1% (KENALOG) 0.1 % OINTMENT    Apply topically 2 (two) times daily.    VITAMIN D 1000 UNITS TAB    Take 185 mg by mouth.       Review of patient's allergies indicates:   Allergen Reactions    Azithromycin        Review of Systems   Constitutional: Positive for malaise/fatigue. Negative for chills and " fever.   HENT: Positive for congestion, ear pain, hearing loss and sore throat. Negative for ear discharge.    Eyes: Negative for pain.   Respiratory: Positive for cough, shortness of breath and stridor.    Cardiovascular: Negative for chest pain and palpitations.   Gastrointestinal: Negative for abdominal pain, nausea and vomiting.   Genitourinary: Negative for dysuria.   Musculoskeletal: Positive for neck pain. Negative for myalgias.   Neurological: Positive for headaches.       Objective:     Vitals:    09/20/19 1611   BP: (!) 171/100   Pulse: 102   Temp: 98 °F (36.7 °C)     There is no height or weight on file to calculate BMI.    Physical Exam   Constitutional: She is oriented to person, place, and time. She appears well-developed and well-nourished. No distress.   HENT:   Head: Normocephalic and atraumatic.   Right Ear: External ear normal.   Left Ear: External ear normal. There is swelling and tenderness. Tympanic membrane is injected, erythematous and bulging. Tympanic membrane is not perforated. A middle ear effusion is present.   Nose: Nose normal. Right sinus exhibits no maxillary sinus tenderness and no frontal sinus tenderness. Left sinus exhibits no maxillary sinus tenderness and no frontal sinus tenderness.   Mouth/Throat: Posterior oropharyngeal erythema present. Tonsils are 0 on the right. Tonsils are 0 on the left. No tonsillar exudate.   Eyes: Pupils are equal, round, and reactive to light. Conjunctivae and EOM are normal.   Neck: No thyromegaly present.   Cardiovascular: Normal rate, regular rhythm, normal heart sounds and intact distal pulses.   No murmur heard.  Pulmonary/Chest: Effort normal and breath sounds normal. No respiratory distress. She has no wheezes. She has no rales. She exhibits no tenderness.   Abdominal: Soft. Bowel sounds are normal. She exhibits no distension. There is no tenderness.   Musculoskeletal: She exhibits no edema.   Lymphadenopathy:     She has no cervical  adenopathy.   Neurological: She is alert and oriented to person, place, and time.   Skin: Skin is warm and dry.   Psychiatric: She has a normal mood and affect.   Nursing note and vitals reviewed.      Assessment and Plan:     Acute suppurative otitis media of left ear without spontaneous rupture of tympanic membrane, recurrence not specified    Sore throat  -     Cancel: POCT Rapid Strep A    Sinusitis, unspecified chronicity, unspecified location    Essential hypertension    Other orders  -     amoxicillin-clavulanate 875-125mg (AUGMENTIN) 875-125 mg per tablet; Take 1 tablet by mouth every 12 (twelve) hours. for 10 days  Dispense: 20 tablet; Refill: 0  -     benzonatate (TESSALON) 100 MG capsule; Take 1 capsule (100 mg total) by mouth 3 (three) times daily as needed for Cough.  Dispense: 45 capsule; Refill: 0  -     promethazine-dextromethorphan (PROMETHAZINE-DM) 6.25-15 mg/5 mL Syrp; Take 5 mLs by mouth nightly as needed (cough).  Dispense: 180 mL; Refill: 0  -     fluconazole (DIFLUCAN) 150 MG Tab; Take 1 tablet (150 mg total) by mouth once daily. yeast for 1 day  Dispense: 1 tablet; Refill: 0    augmentin for ear infection and sinusitis   Advised cessation of all otc medications - ok to buy corceidin - likely decongestant making blood pressure increase  Tylenol as needed for pain - avoid ibuprofen due to elevated blood pressure  Tessalon for cough during the day - promethazine at night   flonase otc for nasal congestion     No follow-ups on file.    @@

## 2024-12-20 DIAGNOSIS — E11.9 TYPE 2 DIABETES MELLITUS WITHOUT COMPLICATIONS: ICD-10-CM

## 2024-12-20 RX ORDER — SITAGLIPTIN 100 MG/1
100 TABLET, FILM COATED ORAL DAILY
Qty: 90 TABLET | Refills: 0 | Status: SHIPPED | OUTPATIENT
Start: 2024-12-20

## 2024-12-20 NOTE — TELEPHONE ENCOUNTER
Rx Refill Note  Requested Prescriptions     Pending Prescriptions Disp Refills    Januvia 100 MG tablet [Pharmacy Med Name: JANUVIA 100 MG TABLET] 90 tablet 0     Sig: TAKE 1 TABLET BY MOUTH DAILY      Last office visit with prescribing clinician: 10/30/2024   Last telemedicine visit with prescribing clinician: Visit date not found   Next office visit with prescribing clinician: 5/1/2025                         Would you like a call back once the refill request has been completed: [] Yes [] No    If the office needs to give you a call back, can they leave a voicemail: [] Yes [] No    Carmenza Agustin MA  12/20/24, 08:20 EST

## 2025-01-07 ENCOUNTER — PATIENT MESSAGE (OUTPATIENT)
Dept: FAMILY MEDICINE CLINIC | Facility: CLINIC | Age: 75
End: 2025-01-07
Payer: MEDICARE

## 2025-01-07 DIAGNOSIS — E11.9 TYPE 2 DIABETES MELLITUS WITHOUT COMPLICATIONS: ICD-10-CM

## 2025-03-08 DIAGNOSIS — I10 ESSENTIAL (PRIMARY) HYPERTENSION: ICD-10-CM

## 2025-03-10 RX ORDER — IRBESARTAN 300 MG/1
300 TABLET ORAL DAILY
Qty: 90 TABLET | Refills: 0 | Status: SHIPPED | OUTPATIENT
Start: 2025-03-10

## 2025-03-10 NOTE — TELEPHONE ENCOUNTER
Rx Refill Note  Requested Prescriptions     Pending Prescriptions Disp Refills    irbesartan (AVAPRO) 300 MG tablet [Pharmacy Med Name: IRBESARTAN 300 MG TABLET] 90 tablet 0     Sig: TAKE 1 TABLET BY MOUTH DAILY      Last office visit with prescribing clinician: 10/30/2024   Last telemedicine visit with prescribing clinician: Visit date not found   Next office visit with prescribing clinician: 5/1/2025                         Would you like a call back once the refill request has been completed: [] Yes [] No    If the office needs to give you a call back, can they leave a voicemail: [] Yes [] No    Carmenza Agustin MA  03/10/25, 07:50 EDT

## 2025-05-01 ENCOUNTER — OFFICE VISIT (OUTPATIENT)
Dept: FAMILY MEDICINE CLINIC | Facility: CLINIC | Age: 75
End: 2025-05-01
Payer: MEDICARE

## 2025-05-01 VITALS
TEMPERATURE: 97.8 F | OXYGEN SATURATION: 99 % | BODY MASS INDEX: 25.82 KG/M2 | SYSTOLIC BLOOD PRESSURE: 120 MMHG | WEIGHT: 160 LBS | DIASTOLIC BLOOD PRESSURE: 70 MMHG | HEART RATE: 96 BPM

## 2025-05-01 DIAGNOSIS — Z00.00 MEDICARE ANNUAL WELLNESS VISIT, SUBSEQUENT: Primary | ICD-10-CM

## 2025-05-01 DIAGNOSIS — I10 ESSENTIAL HYPERTENSION: ICD-10-CM

## 2025-05-01 DIAGNOSIS — E11.9 TYPE 2 DIABETES MELLITUS WITHOUT COMPLICATION, WITHOUT LONG-TERM CURRENT USE OF INSULIN: ICD-10-CM

## 2025-05-01 DIAGNOSIS — E11.9 TYPE 2 DIABETES MELLITUS WITHOUT COMPLICATIONS: ICD-10-CM

## 2025-05-01 DIAGNOSIS — Z12.11 ENCOUNTER FOR SCREENING FOR MALIGNANT NEOPLASM OF COLON: ICD-10-CM

## 2025-05-01 DIAGNOSIS — I10 ESSENTIAL (PRIMARY) HYPERTENSION: ICD-10-CM

## 2025-05-01 DIAGNOSIS — E78.5 HYPERLIPIDEMIA, UNSPECIFIED HYPERLIPIDEMIA TYPE: ICD-10-CM

## 2025-05-01 DIAGNOSIS — R41.3 MEMORY CHANGE: ICD-10-CM

## 2025-05-01 LAB
EXPIRATION DATE: ABNORMAL
EXPIRATION DATE: ABNORMAL
HBA1C MFR BLD: 7.4 % (ref 4.5–5.7)
Lab: ABNORMAL
Lab: ABNORMAL
POC CREATININE URINE: 300
POC MICROALBUMIN URINE: 150

## 2025-05-01 RX ORDER — ATORVASTATIN CALCIUM 20 MG/1
20 TABLET, FILM COATED ORAL NIGHTLY
Qty: 90 TABLET | Refills: 3 | Status: SHIPPED | OUTPATIENT
Start: 2025-05-01

## 2025-05-01 RX ORDER — METFORMIN HYDROCHLORIDE 500 MG/1
1500 TABLET, EXTENDED RELEASE ORAL
Qty: 270 TABLET | Refills: 3 | Status: SHIPPED | OUTPATIENT
Start: 2025-05-01

## 2025-05-01 RX ORDER — IRBESARTAN 300 MG/1
300 TABLET ORAL DAILY
Qty: 90 TABLET | Refills: 3 | Status: SHIPPED | OUTPATIENT
Start: 2025-05-01

## 2025-05-01 NOTE — PATIENT INSTRUCTIONS
Medicare Wellness  Personal Prevention Plan of Service     Date of Office Visit:    Encounter Provider:  Meredith Lea Kehrer, MD  Place of Service:  [facility]  Patient Name: Valery Maynard  :  1950    As part of the Medicare Wellness portion of your visit today, we are providing you with this personalized preventive plan of services (PPPS). This plan is based upon recommendations of the [place] Preventive Services Task Force (USPSTF) and the Advisory Committee on Immunization Practices (ACIP).    This lists the preventive care services that should be considered, and provides dates of when you are due. Items listed as completed are up-to-date and do not require any further intervention.    Health Maintenance   Topic Date Due    URINE MICROALBUMIN-CREATININE RATIO (uACR)  Never done    ZOSTER VACCINE (1 of 2) Never done    DIABETIC FOOT EXAM  2021    COLORECTAL CANCER SCREENING  2022    RSV Vaccine - Adults (1 - 1-dose 75+ series) Never done    ANNUAL WELLNESS VISIT  2025    COVID-19 Vaccine (3 - - season) 2025 (Originally 2024)    INFLUENZA VACCINE  2025    DIABETIC EYE EXAM  2025    LIPID PANEL  10/30/2025    HEMOGLOBIN A1C  2025    DXA SCAN  2028    TDAP/TD VACCINES (2 - Td or Tdap) 10/23/2033    HEPATITIS C SCREENING  Completed    Pneumococcal Vaccine 50+  Completed    MAMMOGRAM  Discontinued       Orders Placed This Encounter   Procedures    TSH     Release to patient:   Routine Release [[id number]]    Vitamin B12     Release to patient:   Routine Release [[id number]]    Lipid Panel     Release to patient:   Routine Release [[id number]]    RPR Qualitative with Reflex to Quant     Release to patient:   Routine Release [[id number]]    Comprehensive Metabolic Panel     Release to patient:   Routine Release [[id number]]    Ambulatory Referral For Screening Colonoscopy     Referral Priority:   Routine     Referral  Type:   Diagnostic Medical     Referral Reason:   Specialty Services Required     Referral Location:   [facility] OUTPAT[facility]     Requested Specialty:   Gastroenterology     Number of Visits Requested:   1    POC Glycosylated Hemoglobin (Hb A1C)     Release to patient:   Routine Release [[id number]]    POC Microalbumin     Release to patient:   Routine Release [[id number]]    CBC & Differential     Manual Differential:   No     Release to patient:   Routine Release [[id number]]       Return in about 6 months (around 11/1/2025) for Diabetes Check.

## 2025-05-01 NOTE — PROGRESS NOTES
The ABCs of the Annual Wellness Visit  Subsequent Medicare Wellness Visit    Subjective    Valery Maynard is a 74 y.o. female who presents for a Subsequent Medicare Wellness Visit.    The following portions of the patient's history were reviewed and   updated as appropriate: allergies, current medications, past family history, past medical history, past social history, past surgical history, and problem list.    Compared to one year ago, the patient feels her physical   health is worse. Slowing down, more tired.     Compared to one year ago, the patient feels her mental   health is the same.    Recent Hospitalizations:  She was not admitted to the hospital during the last year.       Current Medical Providers:  Patient Care Team:  Kehrer, Meredith Lea, MD as PCP - General (Family Medicine)    Outpatient Medications Prior to Visit   Medication Sig Dispense Refill    atorvastatin (LIPITOR) 20 MG tablet TAKE 1 TABLET BY MOUTH DAILY 90 tablet 1    Blood Glucose Monitoring Suppl (FreeStyle Lite) device 1 each Daily. 1 each 0    cetirizine (zyrTEC) 10 MG tablet Take 1 tablet by mouth Daily.      clobetasol (TEMOVATE) 0.05 % external solution Apply  topically to the appropriate area as directed 2 (Two) Times a Day As Needed (itch). 50 mL 0    glucose blood (FREESTYLE LITE) test strip Use as instructed 100 each 3    irbesartan (AVAPRO) 300 MG tablet TAKE 1 TABLET BY MOUTH DAILY 90 tablet 0    ketoconazole (NIZORAL) 2 % shampoo Apply 1 application topically to the appropriate area as directed 2 (Two) Times a Week.      metFORMIN ER (GLUCOPHAGE-XR) 500 MG 24 hr tablet Take 3 tablets by mouth Daily With Breakfast. 270 tablet 3    SITagliptin (Januvia) 100 MG tablet Take 1 tablet by mouth Daily. 90 tablet 1    vitamin B-12 (CYANOCOBALAMIN) 1000 MCG tablet Take 1 tablet by mouth Every Other Day.      Canagliflozin (Invokana) 100 MG tablet tablet Take 1 tablet by mouth Daily. (Patient not taking: Reported on 5/1/2025) 90 tablet 0  "    No facility-administered medications prior to visit.       No opioid medication identified on active medication list. I have reviewed chart for other potential  high risk medication/s and harmful drug interactions in the elderly.        Aspirin is not on active medication list.  Aspirin use is not indicated based on review of current medical condition/s. Risk of harm outweighs potential benefits.  .    Patient Active Problem List   Diagnosis    Positive colorectal cancer screening using DNA-based stool test    Renal calculus, left    Essential hypertension    Hyperlipidemia    Type 2 diabetes mellitus without complication, without long-term current use of insulin    Seborrheic dermatitis of scalp    Other fatigue    Hearing aid worn     Advance Care Planning   Advance Care Planning     Advance Directive is on file.  ACP discussion was held with the patient during this visit. Patient has an advance directive in EMR which is still valid.      Objective    Vitals:    25 0957   BP: 120/70   Pulse: 96   Temp: 97.8 °F (36.6 °C)   SpO2: 99%   Weight: 72.6 kg (160 lb)   PainSc: 0-No pain     Estimated body mass index is 25.82 kg/m² as calculated from the following:    Height as of 10/30/24: 167.6 cm (66\").    Weight as of this encounter: 72.6 kg (160 lb).    BMI is >= 25 and <30. (Overweight) The following options were offered after discussion;: nutrition counseling/recommendations      Does the patient have evidence of cognitive impairment? Yes    Lab Results   Component Value Date    HGBA1C 7.4 (A) 2025        HEALTH RISK ASSESSMENT    Smoking Status:  Social History     Tobacco Use   Smoking Status Former    Current packs/day: 0.00    Average packs/day: 0.5 packs/day for 5.0 years (2.5 ttl pk-yrs)    Types: Cigarettes    Start date:     Quit date:     Years since quittin.3    Passive exposure: Past   Smokeless Tobacco Never   Tobacco Comments    QUIT 25 YRS AGO-was 1 ppd for 10-15 years "     Alcohol Consumption:  Social History     Substance and Sexual Activity   Alcohol Use Not Currently    Comment: RARE     Fall Risk Screen:    AGUSTINA Fall Risk Assessment was completed, and patient is at LOW risk for falls.Assessment completed on:2025    Depression Screenin/1/2025    10:01 AM   PHQ-2/PHQ-9 Depression Screening   Little interest or pleasure in doing things Not at all   Feeling down, depressed, or hopeless Several days   How difficult have these problems made it for you to do your work, take care of things at home, or get along with other people? Not difficult at all       Health Habits and Functional and Cognitive Screenin/1/2025    10:01 AM   Functional & Cognitive Status   Do you have difficulty preparing food and eating? No   Do you have difficulty bathing yourself, getting dressed or grooming yourself? No   Do you have difficulty using the toilet? No   Do you have difficulty moving around from place to place? No   Do you have trouble with steps or getting out of a bed or a chair? No   Current Diet Well Balanced Diet   Dental Exam Up to date   Eye Exam Up to date   Exercise (times per week) 7 times per week   Current Exercises Include Walking   Do you need help using the phone?  No   Are you deaf or do you have serious difficulty hearing?  Yes   Do you need help to go to places out of walking distance? No   Do you need help shopping? No   Do you need help preparing meals?  No   Do you need help with housework?  No   Do you need help with laundry? No   Do you need help taking your medications? No   Do you need help managing money? No   Do you ever drive or ride in a car without wearing a seat belt? No   Have you felt unusual stress, anger or loneliness in the last month? Yes   Who do you live with? Spouse   If you need help, do you have trouble finding someone available to you? No   Have you been bothered in the last four weeks by sexual problems? No   Do you have difficulty  concentrating, remembering or making decisions? No       Age-appropriate Screening Schedule:  Refer to the list below for future screening recommendations based on patient's age, sex and/or medical conditions. Orders for these recommended tests are listed in the plan section. The patient has been provided with a written plan.    Health Maintenance   Topic Date Due    URINE MICROALBUMIN-CREATININE RATIO (uACR)  Never done    ZOSTER VACCINE (1 of 2) Never done    DIABETIC FOOT EXAM  09/21/2021    COLORECTAL CANCER SCREENING  08/31/2022    RSV Vaccine - Adults (1 - 1-dose 75+ series) Never done    ANNUAL WELLNESS VISIT  04/30/2025    COVID-19 Vaccine (3 - 2024-25 season) 11/01/2025 (Originally 9/1/2024)    INFLUENZA VACCINE  07/01/2025    DIABETIC EYE EXAM  09/16/2025    LIPID PANEL  10/30/2025    HEMOGLOBIN A1C  11/01/2025    DXA SCAN  05/02/2028    TDAP/TD VACCINES (2 - Td or Tdap) 10/23/2033    HEPATITIS C SCREENING  Completed    Pneumococcal Vaccine 50+  Completed    MAMMOGRAM  Discontinued                  CMS Preventative Services Quick Reference  Risk Factors Identified During Encounter  None Identified  The above risks/problems have been discussed with the patient.  Pertinent information has been shared with the patient in the After Visit Summary.  An After Visit Summary and PPPS were made available to the patient.    Follow Up:   Next Medicare Wellness visit to be scheduled in 1 year.       Additional E&M Note during same encounter follows:  Patient has multiple medical problems which are significant and separately identifiable that require additional work above and beyond the Medicare Wellness Visit.      Chief Complaint  Medicare Wellness-subsequent    Subjective        HPI  Valery Maynard is also being seen today for follow up.  History of Present Illness  The patient is a 74-year-old female who presents for an annual Medicare wellness visit and follow-up on diabetes, hypertension, and  dyslipidemia.    Increased fatigue, a slight decrease in memory function, and a general sense of slowing down are reported. Despite these symptoms, she maintains a positive outlook on her overall health. No head or neck symptoms, chest pain, or shortness of breath are experienced. Gastrointestinal issues such as nausea, vomiting, or changes in bowel habits are not reported. However, a slight worsening in bladder control is noted, necessitating 3 to 4 nightly bathroom visits instead of the usual 1 to 2. She was advised by her urologist to avoid drinking water at night, but she has heard that drinking some water at night is better if you have diabetes. Occasional feelings of depression are experienced but she does not consider herself to be generally unhappy. She has received a notification for a mammogram and is curious about the recommended frequency of this screening. She recalls undergoing a mammogram last year. She is due for a colonoscopy and expresses a preference for having the procedure performed locally in [place].    Diabetes is currently managed with metformin and Januvia, having discontinued pioglitazone due to leg swelling. An increased craving for sweets over the past few months is observed, a change from her usual dietary habits.            Objective   Vital Signs:  /70   Pulse 96   Temp 97.8 °F (36.6 °C)   Wt 72.6 kg (160 lb)   SpO2 99%   BMI 25.82 kg/m²     Physical Exam  Vitals and nursing note reviewed.   Constitutional:       General: She is not in acute distress.     Appearance: Normal appearance. She is well-developed.   HENT:      Head: Normocephalic and atraumatic.      Right Ear: Tympanic membrane, ear canal and external ear normal.      Left Ear: Tympanic membrane, ear canal and external ear normal.      Nose: Nose normal.      Mouth/Throat:      Mouth: Mucous membranes are moist.      Pharynx: Oropharynx is clear. No oropharyngeal exudate or posterior oropharyngeal erythema.    Eyes:      Conjunctiva/sclera: Conjunctivae normal.      Pupils: Pupils are equal, round, and reactive to light.   Neck:      Thyroid: No thyromegaly.   Cardiovascular:      Rate and Rhythm: Normal rate and regular rhythm.      Heart sounds: No murmur heard.  Pulmonary:      Effort: Pulmonary effort is normal.      Breath sounds: Normal breath sounds. No wheezing.   Abdominal:      General: Abdomen is flat. Bowel sounds are normal. There is no distension.      Palpations: Abdomen is soft. There is no mass.      Tenderness: There is no abdominal tenderness.      Hernia: No hernia is present.   Musculoskeletal:         General: No swelling. Normal range of motion.      Cervical back: Normal range of motion and neck supple.      Right lower leg: No edema.      Left lower leg: No edema.   Lymphadenopathy:      Cervical: No cervical adenopathy.   Skin:     General: Skin is warm and dry.      Capillary Refill: Capillary refill takes less than 2 seconds.      Findings: No rash.   Neurological:      General: No focal deficit present.      Mental Status: She is alert and oriented to person, place, and time.      Cranial Nerves: No cranial nerve deficit.   Psychiatric:         Mood and Affect: Mood normal.         Behavior: Behavior normal.        Physical Exam                  Results  Labs   - A1c: 05/01/2025, 7.4     15/30 Mini-Mental     Assessment and Plan   Diagnoses and all orders for this visit:    1. Medicare annual wellness visit, subsequent (Primary)    2. Type 2 diabetes mellitus without complication, without long-term current use of insulin  -     POC Glycosylated Hemoglobin (Hb A1C)  -     POC Microalbumin  -     TSH  -     Vitamin B12  -     Lipid Panel  -     RPR Qualitative with Reflex to Quant  -     CBC & Differential  -     Comprehensive Metabolic Panel    3. Essential hypertension  -     TSH  -     Vitamin B12  -     Lipid Panel  -     RPR Qualitative with Reflex to Quant  -     CBC & Differential  -      Comprehensive Metabolic Panel    4. Hyperlipidemia, unspecified hyperlipidemia type  -     Lipid Panel  -     Comprehensive Metabolic Panel    5. Encounter for screening for malignant neoplasm of colon  -     Ambulatory Referral For Screening Colonoscopy    6. Memory change  -     TSH  -     Vitamin B12  -     Lipid Panel  -     RPR Qualitative with Reflex to Quant  -     CBC & Differential  -     Comprehensive Metabolic Panel      Assessment & Plan  1. Annual Medicare wellness visit.  - Reports increased fatigue, slowing down, and minor memory issues.  - No head, neck, chest, or joint symptoms; bladder issues noted with increased nocturia.  - Additional labs ordered to assess B12 and thyroid levels.  - Advised to schedule a colonoscopy with Dr. Shi and to consider a mammogram, with future screenings being optional.    2. Diabetes mellitus.  - A1c level today is 7.4, within the acceptable range for her age group.  - Currently taking metformin and Januvia for diabetes management.  - Advised to maintain a balanced diet and limit intake of sweets.  - Follow-up diabetes check scheduled in 6 months.    3. Hypertension.  - No specific symptoms reported related to hypertension.  - Currently managing hypertension with candesartan.  - Blood pressure management will continue with current medication regimen.    4. Dyslipidemia.  - No specific symptoms reported related to dyslipidemia.  - Currently managing dyslipidemia with atorvastatin.  - Lipid levels will be monitored as part of routine follow-up.    5.  Memory change.  Low score on Mini-Mental but she did not have a hearing aid and had difficulty hearing.  We will check labs.  Patient encouraged to use her hearing aid.  May do neuropsych evaluation pending labs.            Follow Up   Return in about 6 months (around 11/1/2025) for Diabetes Check.  Patient was given instructions and counseling regarding her condition or for health maintenance advice. Please see  specific information pulled into the AVS if appropriate.     Patient or patient representative verbalized consent for the use of Ambient Listening during the visit with  Meredith Lea Kehrer, MD for chart documentation. 5/1/2025  10:30 EDT

## 2025-05-02 LAB
ALBUMIN SERPL-MCNC: 4.2 G/DL (ref 3.5–5.2)
ALBUMIN/GLOB SERPL: 1.8 G/DL
ALP SERPL-CCNC: 129 U/L (ref 39–117)
ALT SERPL-CCNC: 14 U/L (ref 1–33)
AST SERPL-CCNC: 19 U/L (ref 1–32)
BASOPHILS # BLD AUTO: 0.03 10*3/MM3 (ref 0–0.2)
BASOPHILS NFR BLD AUTO: 0.6 % (ref 0–1.5)
BILIRUB SERPL-MCNC: 0.6 MG/DL (ref 0–1.2)
BUN SERPL-MCNC: 16 MG/DL (ref 8–23)
BUN/CREAT SERPL: 22.2 (ref 7–25)
CALCIUM SERPL-MCNC: 9.1 MG/DL (ref 8.6–10.5)
CHLORIDE SERPL-SCNC: 106 MMOL/L (ref 98–107)
CHOLEST SERPL-MCNC: 172 MG/DL (ref 0–200)
CO2 SERPL-SCNC: 26.4 MMOL/L (ref 22–29)
CREAT SERPL-MCNC: 0.72 MG/DL (ref 0.57–1)
EGFRCR SERPLBLD CKD-EPI 2021: 87.3 ML/MIN/1.73
EOSINOPHIL # BLD AUTO: 0.3 10*3/MM3 (ref 0–0.4)
EOSINOPHIL NFR BLD AUTO: 6 % (ref 0.3–6.2)
ERYTHROCYTE [DISTWIDTH] IN BLOOD BY AUTOMATED COUNT: 12.2 % (ref 12.3–15.4)
GLOBULIN SER CALC-MCNC: 2.3 GM/DL
GLUCOSE SERPL-MCNC: 137 MG/DL (ref 65–99)
HCT VFR BLD AUTO: 40.9 % (ref 34–46.6)
HDLC SERPL-MCNC: 54 MG/DL (ref 40–60)
HGB BLD-MCNC: 13.2 G/DL (ref 12–15.9)
IMM GRANULOCYTES # BLD AUTO: 0.01 10*3/MM3 (ref 0–0.05)
IMM GRANULOCYTES NFR BLD AUTO: 0.2 % (ref 0–0.5)
LDLC SERPL CALC-MCNC: 96 MG/DL (ref 0–100)
LYMPHOCYTES # BLD AUTO: 1.4 10*3/MM3 (ref 0.7–3.1)
LYMPHOCYTES NFR BLD AUTO: 28 % (ref 19.6–45.3)
MCH RBC QN AUTO: 30.6 PG (ref 26.6–33)
MCHC RBC AUTO-ENTMCNC: 32.3 G/DL (ref 31.5–35.7)
MCV RBC AUTO: 94.7 FL (ref 79–97)
MONOCYTES # BLD AUTO: 0.49 10*3/MM3 (ref 0.1–0.9)
MONOCYTES NFR BLD AUTO: 9.8 % (ref 5–12)
NEUTROPHILS # BLD AUTO: 2.77 10*3/MM3 (ref 1.7–7)
NEUTROPHILS NFR BLD AUTO: 55.4 % (ref 42.7–76)
NRBC BLD AUTO-RTO: 0 /100 WBC (ref 0–0.2)
PLATELET # BLD AUTO: 141 10*3/MM3 (ref 140–450)
POTASSIUM SERPL-SCNC: 4.2 MMOL/L (ref 3.5–5.2)
PROT SERPL-MCNC: 6.5 G/DL (ref 6–8.5)
RBC # BLD AUTO: 4.32 10*6/MM3 (ref 3.77–5.28)
RPR SER QL: NON REACTIVE
SODIUM SERPL-SCNC: 144 MMOL/L (ref 136–145)
TRIGL SERPL-MCNC: 127 MG/DL (ref 0–150)
TSH SERPL DL<=0.005 MIU/L-ACNC: 1.44 UIU/ML (ref 0.27–4.2)
VIT B12 SERPL-MCNC: 1281 PG/ML (ref 211–946)
VLDLC SERPL CALC-MCNC: 22 MG/DL (ref 5–40)
WBC # BLD AUTO: 5 10*3/MM3 (ref 3.4–10.8)

## (undated) DEVICE — Device: Brand: DEFENDO AIR/WATER/SUCTION AND BIOPSY VALVE

## (undated) DEVICE — NITINOL WIRE WITH HYDROPHILIC TIP: Brand: SENSOR

## (undated) DEVICE — CATH URETRL FLXITP POLLACK STD 5F 70CM

## (undated) DEVICE — TBG 02 CRUSH RESIST LF CLR 7FT

## (undated) DEVICE — THE TORRENT IRRIGATION SCOPE CONNECTOR IS USED WITH THE TORRENT IRRIGATION TUBING TO PROVIDE IRRIGATION FLUIDS SUCH AS STERILE WATER DURING GASTROINTESTINAL ENDOSCOPIC PROCEDURES WHEN USED IN CONJUNCTION WITH AN IRRIGATION PUMP (OR ELECTROSURGICAL UNIT).: Brand: TORRENT

## (undated) DEVICE — GLV SURG TRIUMPH CLASSIC PF LTX 8 STRL

## (undated) DEVICE — LOU CYSTO: Brand: MEDLINE INDUSTRIES, INC.

## (undated) DEVICE — CANNULA,ADULT,SOFT-TOUCH,7TUBE,SC: Brand: MEDLINE

## (undated) DEVICE — SINGLE-USE BIOPSY FORCEPS: Brand: RADIAL JAW 4

## (undated) DEVICE — TUBING, SUCTION, 1/4" X 10', STRAIGHT: Brand: MEDLINE